# Patient Record
Sex: MALE | HISPANIC OR LATINO | Employment: FULL TIME | URBAN - METROPOLITAN AREA
[De-identification: names, ages, dates, MRNs, and addresses within clinical notes are randomized per-mention and may not be internally consistent; named-entity substitution may affect disease eponyms.]

---

## 2023-03-23 PROBLEM — K21.9 GERD (GASTROESOPHAGEAL REFLUX DISEASE): Status: ACTIVE | Noted: 2023-03-23

## 2023-03-23 PROBLEM — K44.9 HERNIA, HIATAL: Status: ACTIVE | Noted: 2023-03-23

## 2023-03-23 PROBLEM — R12 HEART BURN: Status: ACTIVE | Noted: 2023-03-23

## 2023-03-23 RX ORDER — OMEPRAZOLE/SODIUM BICARBONATE 20MG-1.1G
CAPSULE ORAL
COMMUNITY
End: 2023-04-20 | Stop reason: ALTCHOICE

## 2023-03-23 RX ORDER — CITALOPRAM 20 MG/1
TABLET, FILM COATED ORAL
COMMUNITY
End: 2023-04-20 | Stop reason: ALTCHOICE

## 2023-03-23 RX ORDER — LOSARTAN POTASSIUM 100 MG/1
TABLET ORAL
COMMUNITY
End: 2023-04-20 | Stop reason: ALTCHOICE

## 2023-03-29 ENCOUNTER — APPOINTMENT (OUTPATIENT)
Dept: PRIMARY CARE | Facility: CLINIC | Age: 48
End: 2023-03-29
Payer: COMMERCIAL

## 2023-04-20 ENCOUNTER — OFFICE VISIT (OUTPATIENT)
Dept: PRIMARY CARE | Facility: CLINIC | Age: 48
End: 2023-04-20
Payer: COMMERCIAL

## 2023-04-20 VITALS
HEART RATE: 62 BPM | BODY MASS INDEX: 30.62 KG/M2 | TEMPERATURE: 97.1 F | DIASTOLIC BLOOD PRESSURE: 89 MMHG | OXYGEN SATURATION: 95 % | WEIGHT: 202 LBS | HEIGHT: 68 IN | SYSTOLIC BLOOD PRESSURE: 144 MMHG

## 2023-04-20 DIAGNOSIS — K21.9 GASTROESOPHAGEAL REFLUX DISEASE WITHOUT ESOPHAGITIS: ICD-10-CM

## 2023-04-20 DIAGNOSIS — I10 PRIMARY HYPERTENSION: Primary | ICD-10-CM

## 2023-04-20 DIAGNOSIS — F31.9 BIPOLAR AND RELATED DISORDER (MULTI): ICD-10-CM

## 2023-04-20 DIAGNOSIS — K58.0 IRRITABLE BOWEL SYNDROME WITH DIARRHEA: ICD-10-CM

## 2023-04-20 DIAGNOSIS — F32.A DEPRESSION, UNSPECIFIED DEPRESSION TYPE: ICD-10-CM

## 2023-04-20 PROCEDURE — 3079F DIAST BP 80-89 MM HG: CPT | Performed by: STUDENT IN AN ORGANIZED HEALTH CARE EDUCATION/TRAINING PROGRAM

## 2023-04-20 PROCEDURE — 99214 OFFICE O/P EST MOD 30 MIN: CPT | Performed by: STUDENT IN AN ORGANIZED HEALTH CARE EDUCATION/TRAINING PROGRAM

## 2023-04-20 PROCEDURE — 3077F SYST BP >= 140 MM HG: CPT | Performed by: STUDENT IN AN ORGANIZED HEALTH CARE EDUCATION/TRAINING PROGRAM

## 2023-04-20 RX ORDER — DISULFIRAM 250 MG/1
1 TABLET ORAL DAILY
COMMUNITY
Start: 2022-12-23 | End: 2023-04-20 | Stop reason: ALTCHOICE

## 2023-04-20 RX ORDER — ELUXADOLINE 100 MG/1
TABLET, FILM COATED ORAL
COMMUNITY
Start: 2022-06-22 | End: 2023-10-26 | Stop reason: ALTCHOICE

## 2023-04-20 RX ORDER — VORTIOXETINE 20 MG/1
1 TABLET, FILM COATED ORAL DAILY
COMMUNITY
Start: 2023-02-15 | End: 2023-04-20 | Stop reason: SDUPTHER

## 2023-04-20 RX ORDER — OLMESARTAN MEDOXOMIL 40 MG/1
40 TABLET ORAL DAILY
Qty: 90 TABLET | Refills: 1 | Status: SHIPPED | OUTPATIENT
Start: 2023-04-20 | End: 2023-08-03 | Stop reason: SDUPTHER

## 2023-04-20 RX ORDER — PANTOPRAZOLE SODIUM 40 MG/1
1 TABLET, DELAYED RELEASE ORAL DAILY
COMMUNITY
Start: 2023-02-15 | End: 2023-04-20 | Stop reason: SDUPTHER

## 2023-04-20 RX ORDER — PANTOPRAZOLE SODIUM 40 MG/1
40 TABLET, DELAYED RELEASE ORAL DAILY
Qty: 90 TABLET | Refills: 1 | Status: SHIPPED | OUTPATIENT
Start: 2023-04-20 | End: 2023-08-03 | Stop reason: SDUPTHER

## 2023-04-20 RX ORDER — HYOSCYAMINE SULFATE 0.125 MG
TABLET ORAL
COMMUNITY
Start: 2022-10-28 | End: 2023-04-20 | Stop reason: ALTCHOICE

## 2023-04-20 RX ORDER — QUETIAPINE FUMARATE 100 MG/1
TABLET, FILM COATED ORAL
COMMUNITY
Start: 2023-03-14 | End: 2023-04-20 | Stop reason: SDUPTHER

## 2023-04-20 RX ORDER — OLMESARTAN MEDOXOMIL 40 MG/1
1 TABLET ORAL DAILY
COMMUNITY
Start: 2022-10-06 | End: 2023-04-20 | Stop reason: SDUPTHER

## 2023-04-20 RX ORDER — NEBIVOLOL 20 MG/1
20 TABLET ORAL DAILY
Qty: 90 TABLET | Refills: 1 | Status: SHIPPED | OUTPATIENT
Start: 2023-04-20 | End: 2023-08-03 | Stop reason: SDUPTHER

## 2023-04-20 RX ORDER — QUETIAPINE FUMARATE 200 MG/1
TABLET, FILM COATED ORAL
Qty: 90 TABLET | Refills: 1 | Status: SHIPPED | OUTPATIENT
Start: 2023-04-20 | End: 2023-07-17

## 2023-04-20 RX ORDER — NEBIVOLOL 20 MG/1
1 TABLET ORAL DAILY
COMMUNITY
Start: 2023-04-01 | End: 2023-04-20 | Stop reason: SDUPTHER

## 2023-04-20 ASSESSMENT — ENCOUNTER SYMPTOMS
CHILLS: 0
DIZZINESS: 0
NAUSEA: 0
MUSCULOSKELETAL NEGATIVE: 1
COLOR CHANGE: 0
SHORTNESS OF BREATH: 0
FEVER: 0
HEADACHES: 0
VOMITING: 0
ABDOMINAL PAIN: 0
UNEXPECTED WEIGHT CHANGE: 0
PALPITATIONS: 0
WHEEZING: 0
FATIGUE: 0
CONSTIPATION: 0
CONFUSION: 0
DIARRHEA: 0
COUGH: 0

## 2023-04-20 ASSESSMENT — PATIENT HEALTH QUESTIONNAIRE - PHQ9
SUM OF ALL RESPONSES TO PHQ9 QUESTIONS 1 AND 2: 0
2. FEELING DOWN, DEPRESSED OR HOPELESS: NOT AT ALL
1. LITTLE INTEREST OR PLEASURE IN DOING THINGS: NOT AT ALL

## 2023-04-20 ASSESSMENT — PAIN SCALES - GENERAL: PAINLEVEL: 0-NO PAIN

## 2023-04-20 NOTE — PROGRESS NOTES
"Subjective   Patient ID: Joshua Padilla is a 47 y.o. male who presents for New Patient Visit (Pt is here for NPv, pt used to go to [facility] [place]. Would like to estab care, no other concern. Blood in stool. Pt takes meds which are prescribed by  from [facility]).    HPI   Here to estb care and also for chronic care FU. Reports he has been taking multiple meds and would like refills on them as listed below.     # HTN   - io BP was 144/89  - takes bystolic 20mg, benicar 40 mg.     # Depression # Bipolar disorder   - reports mood been stable; reports he has been on the meds for quite some times and are working well.   - takes seroquel 200 mg ans trintellix 20 mg, req refills     # GERD   - reports reflux been stable   - takes PPI 40 mg daily     # IBD-D  - reports intermittent diarrhea, takes viberzi with help   - he has bene out for last mo and having some flare ups   - reports intermittently gets bld in the stool     Review of Systems   Constitutional:  Negative for chills, fatigue, fever and unexpected weight change.   HENT: Negative.     Respiratory:  Negative for cough, shortness of breath and wheezing.    Cardiovascular:  Negative for chest pain, palpitations and leg swelling.   Gastrointestinal:  Negative for abdominal pain, constipation, diarrhea, nausea and vomiting.   Musculoskeletal: Negative.    Skin:  Negative for color change and rash.   Neurological:  Negative for dizziness and headaches.   Psychiatric/Behavioral:  Negative for behavioral problems and confusion.        Objective   /89 (BP Location: Left arm, Patient Position: Sitting, BP Cuff Size: Adult)   Pulse 62   Temp 36.2 °C (97.1 °F)   Ht 1.715 m (5' 7.5\")   Wt 91.6 kg (202 lb)   SpO2 95%   BMI 31.17 kg/m²     Physical Exam  Vitals and nursing note reviewed.   Constitutional:       Appearance: Normal appearance.   Cardiovascular:      Rate and Rhythm: Normal rate and regular rhythm.      Pulses: Normal " pulses.      Heart sounds: Normal heart sounds.   Pulmonary:      Effort: Pulmonary effort is normal. No respiratory distress.      Breath sounds: Normal breath sounds.   Abdominal:      General: Abdomen is flat. Bowel sounds are normal.      Palpations: Abdomen is soft.   Musculoskeletal:         General: Normal range of motion.   Neurological:      General: No focal deficit present.      Mental Status: He is alert and oriented to person, place, and time.   Psychiatric:         Mood and Affect: Mood normal.         Behavior: Behavior normal.       Assessment/Plan   He is here to [facility] care and also for FU visit. Overall doing okay, no major concerns today and is clinically & vitally stable except elevated BP. Plan as follows     # HTN   - io BP was 144/89, likely from walking to office.   - will cont bystolic 20mg & benicar 40 mg as usual for now   - rec to keep BP logs, rec dash diet & daily exercises     # Depression # Bipolar disorder   - stable mood, cont current meds as below   - cont seroquel 200 mg ans trintellix 20 mg.     # GERD   - well controlled, cont same     # IBD-D  - cont fiber rich diet & hydration   - holding on refills on Viberzi, pending verifications of med records.     Problem List Items Addressed This Visit          Digestive    GERD (gastroesophageal reflux disease)    Relevant Medications    pantoprazole (ProtoNix) 40 mg EC tablet     Other Visit Diagnoses       Primary hypertension    -  Primary    Relevant Medications    nebivolol (Bystolic) 20 mg tablet    olmesartan (BENIcar) 40 mg tablet    Bipolar and related disorder (CMS/HCC)        Relevant Medications    QUEtiapine (SEROquel) 200 mg tablet    Depression, unspecified depression type        Relevant Medications    vortioxetine (Trintellix) 20 mg tablet tablet    QUEtiapine (SEROquel) 200 mg tablet    Irritable bowel syndrome with diarrhea              Rtc 3 mo for FU visit.    Buck Villarreal MD    [facility], Family Medicine

## 2023-06-12 ENCOUNTER — TELEPHONE (OUTPATIENT)
Dept: PRIMARY CARE | Facility: CLINIC | Age: 48
End: 2023-06-12
Payer: COMMERCIAL

## 2023-06-12 NOTE — TELEPHONE ENCOUNTER
Pt is concerned about symptoms below whenever he has a bowel movement. Pt would like you to advise. Pt has had symptoms since the end of April. Pt has a low urine output and pain and pressure with each bowel movement. Pt sees a lot of blood with each bowel movement.   Pharmacy: [facility]

## 2023-06-12 NOTE — TELEPHONE ENCOUNTER
Spoke with pts wife.  She said pt had hernia surgery about a year ago and it has popped back out.  I told pt we would route message but felt he should be looked at by ER.  Pts wife said she didn't know if he would go.

## 2023-06-13 NOTE — TELEPHONE ENCOUNTER
Spoke with pt and offered him an appt a little bit sooner and he said no.  Said he will go to ER if it continues to get worse.

## 2023-07-12 DIAGNOSIS — F32.A DEPRESSION, UNSPECIFIED DEPRESSION TYPE: ICD-10-CM

## 2023-07-12 DIAGNOSIS — F31.9 BIPOLAR AND RELATED DISORDER (MULTI): ICD-10-CM

## 2023-07-13 RX ORDER — LISINOPRIL 20 MG/1
20 TABLET ORAL
COMMUNITY
End: 2023-08-03 | Stop reason: ALTCHOICE

## 2023-07-17 RX ORDER — QUETIAPINE FUMARATE 200 MG/1
TABLET, FILM COATED ORAL
Qty: 60 TABLET | Refills: 0 | Status: SHIPPED | OUTPATIENT
Start: 2023-07-17 | End: 2023-08-03 | Stop reason: ALTCHOICE

## 2023-08-03 ENCOUNTER — OFFICE VISIT (OUTPATIENT)
Dept: PRIMARY CARE | Facility: CLINIC | Age: 48
End: 2023-08-03
Payer: COMMERCIAL

## 2023-08-03 DIAGNOSIS — F31.9 BIPOLAR AND RELATED DISORDER (MULTI): ICD-10-CM

## 2023-08-03 DIAGNOSIS — K21.9 GASTROESOPHAGEAL REFLUX DISEASE WITHOUT ESOPHAGITIS: ICD-10-CM

## 2023-08-03 DIAGNOSIS — R42 DIZZINESS: ICD-10-CM

## 2023-08-03 DIAGNOSIS — E78.49 OTHER HYPERLIPIDEMIA: ICD-10-CM

## 2023-08-03 DIAGNOSIS — R82.998 DARK URINE: ICD-10-CM

## 2023-08-03 DIAGNOSIS — I10 PRIMARY HYPERTENSION: Primary | ICD-10-CM

## 2023-08-03 DIAGNOSIS — Z12.5 SCREENING FOR PROSTATE CANCER: ICD-10-CM

## 2023-08-03 DIAGNOSIS — F32.A DEPRESSION, UNSPECIFIED DEPRESSION TYPE: ICD-10-CM

## 2023-08-03 DIAGNOSIS — R19.5 LOOSE BOWEL MOVEMENT: ICD-10-CM

## 2023-08-03 PROCEDURE — 3074F SYST BP LT 130 MM HG: CPT | Performed by: STUDENT IN AN ORGANIZED HEALTH CARE EDUCATION/TRAINING PROGRAM

## 2023-08-03 PROCEDURE — 99214 OFFICE O/P EST MOD 30 MIN: CPT | Performed by: STUDENT IN AN ORGANIZED HEALTH CARE EDUCATION/TRAINING PROGRAM

## 2023-08-03 PROCEDURE — 3078F DIAST BP <80 MM HG: CPT | Performed by: STUDENT IN AN ORGANIZED HEALTH CARE EDUCATION/TRAINING PROGRAM

## 2023-08-03 RX ORDER — LISINOPRIL 20 MG/1
20 TABLET ORAL
Qty: 90 TABLET | Refills: 0 | Status: CANCELLED | OUTPATIENT
Start: 2023-08-03

## 2023-08-03 RX ORDER — OLMESARTAN MEDOXOMIL 40 MG/1
40 TABLET ORAL DAILY
Qty: 90 TABLET | Refills: 1 | Status: SHIPPED | OUTPATIENT
Start: 2023-08-03 | End: 2024-02-15 | Stop reason: SDUPTHER

## 2023-08-03 RX ORDER — ELUXADOLINE 100 MG/1
TABLET, FILM COATED ORAL
Qty: 90 TABLET | Refills: 0 | Status: CANCELLED | OUTPATIENT
Start: 2023-08-03

## 2023-08-03 RX ORDER — QUETIAPINE FUMARATE 200 MG/1
TABLET, FILM COATED ORAL
Qty: 60 TABLET | Refills: 0 | Status: CANCELLED | OUTPATIENT
Start: 2023-08-03

## 2023-08-03 RX ORDER — PANTOPRAZOLE SODIUM 40 MG/1
40 TABLET, DELAYED RELEASE ORAL DAILY
Qty: 90 TABLET | Refills: 1 | Status: SHIPPED | OUTPATIENT
Start: 2023-08-03 | End: 2024-02-15 | Stop reason: SDUPTHER

## 2023-08-03 RX ORDER — QUETIAPINE FUMARATE 200 MG/1
200 TABLET, FILM COATED ORAL NIGHTLY
Qty: 90 TABLET | Refills: 0 | Status: SHIPPED | OUTPATIENT
Start: 2023-08-03 | End: 2023-11-13

## 2023-08-03 RX ORDER — NEBIVOLOL 20 MG/1
20 TABLET ORAL DAILY
Qty: 90 TABLET | Refills: 1 | Status: SHIPPED | OUTPATIENT
Start: 2023-08-03 | End: 2024-02-15 | Stop reason: SDUPTHER

## 2023-08-03 NOTE — PROGRESS NOTES
Subjective   Patient ID: Joshua Padilla is a 47 y.o. male who presents for Follow-up (Pt is here for FUV, pt has no other concern. Pt Bps readings are low, pt said he felt like passing out twice today.).    HPI   He is here for FU visit. Reports he is doing okay except feeling dizzy for the last few days.      # HTN   - io BP was 93/59; last 115-150/70-80s  - takes benicar 40 mg & bystolic 20 mg    - chart showed some wt lost, about 8 lbs since LOV; reports sl worsening diarrhea from IBS     # Depression # Bipolar disorder   - repots stable mood; takes seroquel 200 mg and trintellix 20 mg daily. Req refills      # GERD   - reports stable reflux sx; takes PPI 40 mg daily      # IBS-D  - reports sl worsening diarrhea as he is not taking Viberzi, still unable rec med records from prior provider.     Review of Systems   Constitutional:  Negative for chills, fatigue, fever and unexpected weight change.   HENT: Negative.     Respiratory:  Negative for cough, shortness of breath and wheezing.    Cardiovascular:  Negative for chest pain, palpitations and leg swelling.   Gastrointestinal:  Negative for abdominal pain, constipation, diarrhea, nausea and vomiting.   Musculoskeletal: Negative.    Skin:  Negative for color change and rash.   Neurological:  Negative for dizziness and headaches.   Psychiatric/Behavioral:  Negative for behavioral problems and confusion.        Objective   BP 93/59 (BP Location: Left arm, Patient Position: Sitting)   Pulse 60   Temp 35.9 °C (96.6 °F)   Wt 88.2 kg (194 lb 6.6 oz)   SpO2 93%   BMI 30.00 kg/m²     Physical Exam  Vitals and nursing note reviewed.   Constitutional:       Appearance: Normal appearance. He is obese.   Cardiovascular:      Rate and Rhythm: Normal rate and regular rhythm.      Pulses: Normal pulses.      Heart sounds: Normal heart sounds.   Pulmonary:      Effort: Pulmonary effort is normal. No respiratory distress.      Breath sounds: Normal breath sounds.    Abdominal:      General: Abdomen is flat. Bowel sounds are normal.      Palpations: Abdomen is soft.   Musculoskeletal:         General: Normal range of motion.   Neurological:      General: No focal deficit present.      Mental Status: He is alert and oriented to person, place, and time.   Psychiatric:         Mood and Affect: Mood normal.         Behavior: Behavior normal.       Assessment/Plan   He is here for FU visit; overall doing okay except intermittent IBS-d flare ups and also low BP likely from dehydration as he is not drinking water as much vs other; otherwise clinically stable. Other plan as follows     # HTN   - io BP was 93/59; last 115-150/70-80s; softer BP, likely from dehydration vs other   - adv to take benicar 40 mg 1/2 tab daily; cont bystolic as usual   - his weight loss likely from ongoing chronic diarrhea   - BW ordered as below along with urine     # Depression # Bipolar disorder   - stable mood, cont current meds as below   - cont seroquel 200 mg and trintellix 20 mg.      # GERD   - well controlled, cont same      # IBS-D  - cont fiber rich diet & hydration   - holding on refills on Viberzi, adv to get the records soon for starting the meds.   Problem List Items Addressed This Visit       GERD (gastroesophageal reflux disease)    Relevant Medications    pantoprazole (ProtoNix) 40 mg EC tablet     Other Visit Diagnoses       Primary hypertension    -  Primary    Relevant Medications    nebivolol (Bystolic) 20 mg tablet    olmesartan (BENIcar) 40 mg tablet    Other Relevant Orders    Comprehensive Metabolic Panel    Bipolar and related disorder (CMS/HCC)        Relevant Medications    QUEtiapine (Seroquel) 200 mg tablet    Depression, unspecified depression type        Relevant Medications    vortioxetine (Trintellix) 20 mg tablet tablet    QUEtiapine (Seroquel) 200 mg tablet    Loose bowel movement        Relevant Orders    Tsh With Reflex To Free T4 If Abnormal    Dizziness         Relevant Orders    CBC and Auto Differential    Comprehensive Metabolic Panel    Tsh With Reflex To Free T4 If Abnormal    Other hyperlipidemia        Relevant Orders    Lipid Panel    Screening for prostate cancer        Relevant Orders    Prostate Spec.Ag,Screen    Dark urine        Relevant Orders    CBC and Auto Differential    Urinalysis with Reflex Microscopic          Rtc 3 mo for HM/FU    Buck Villarreal MD   [facility], Family Medicine

## 2023-08-04 ENCOUNTER — LAB (OUTPATIENT)
Dept: LAB | Facility: LAB | Age: 48
End: 2023-08-04
Payer: COMMERCIAL

## 2023-08-04 VITALS
HEART RATE: 60 BPM | WEIGHT: 194.41 LBS | OXYGEN SATURATION: 93 % | TEMPERATURE: 96.6 F | BODY MASS INDEX: 30 KG/M2 | SYSTOLIC BLOOD PRESSURE: 93 MMHG | DIASTOLIC BLOOD PRESSURE: 59 MMHG

## 2023-08-04 DIAGNOSIS — R42 DIZZINESS: ICD-10-CM

## 2023-08-04 DIAGNOSIS — I10 PRIMARY HYPERTENSION: ICD-10-CM

## 2023-08-04 DIAGNOSIS — Z12.5 SCREENING FOR PROSTATE CANCER: ICD-10-CM

## 2023-08-04 DIAGNOSIS — R82.998 DARK URINE: ICD-10-CM

## 2023-08-04 DIAGNOSIS — E78.49 OTHER HYPERLIPIDEMIA: ICD-10-CM

## 2023-08-04 DIAGNOSIS — R19.5 LOOSE BOWEL MOVEMENT: ICD-10-CM

## 2023-08-04 LAB
ALANINE AMINOTRANSFERASE (SGPT) (U/L) IN SER/PLAS: 8 U/L (ref 10–52)
ALBUMIN (G/DL) IN SER/PLAS: 4.1 G/DL (ref 3.4–5)
ALKALINE PHOSPHATASE (U/L) IN SER/PLAS: 65 U/L (ref 33–120)
ANION GAP IN SER/PLAS: 14 MMOL/L (ref 10–20)
APPEARANCE, URINE: CLEAR
ASPARTATE AMINOTRANSFERASE (SGOT) (U/L) IN SER/PLAS: 14 U/L (ref 9–39)
BASOPHILS (10*3/UL) IN BLOOD BY AUTOMATED COUNT: 0.08 X10E9/L (ref 0–0.1)
BASOPHILS/100 LEUKOCYTES IN BLOOD BY AUTOMATED COUNT: 1.1 % (ref 0–2)
BILIRUBIN TOTAL (MG/DL) IN SER/PLAS: 0.4 MG/DL (ref 0–1.2)
BILIRUBIN, URINE: NEGATIVE
BLOOD, URINE: ABNORMAL
CALCIUM (MG/DL) IN SER/PLAS: 9.1 MG/DL (ref 8.6–10.3)
CARBON DIOXIDE, TOTAL (MMOL/L) IN SER/PLAS: 24 MMOL/L (ref 21–32)
CHLORIDE (MMOL/L) IN SER/PLAS: 106 MMOL/L (ref 98–107)
CHOLESTEROL (MG/DL) IN SER/PLAS: 220 MG/DL (ref 0–199)
CHOLESTEROL IN HDL (MG/DL) IN SER/PLAS: 48.3 MG/DL
CHOLESTEROL/HDL RATIO: 4.6
COLOR, URINE: YELLOW
CREATININE (MG/DL) IN SER/PLAS: 1.34 MG/DL (ref 0.5–1.3)
EOSINOPHILS (10*3/UL) IN BLOOD BY AUTOMATED COUNT: 0.34 X10E9/L (ref 0–0.7)
EOSINOPHILS/100 LEUKOCYTES IN BLOOD BY AUTOMATED COUNT: 4.8 % (ref 0–6)
ERYTHROCYTE DISTRIBUTION WIDTH (RATIO) BY AUTOMATED COUNT: 16.3 % (ref 11.5–14.5)
ERYTHROCYTE MEAN CORPUSCULAR HEMOGLOBIN CONCENTRATION (G/DL) BY AUTOMATED: 31.4 G/DL (ref 32–36)
ERYTHROCYTE MEAN CORPUSCULAR VOLUME (FL) BY AUTOMATED COUNT: 74 FL (ref 80–100)
ERYTHROCYTES (10*6/UL) IN BLOOD BY AUTOMATED COUNT: 4.67 X10E12/L (ref 4.5–5.9)
GFR MALE: 65 ML/MIN/1.73M2
GLUCOSE (MG/DL) IN SER/PLAS: 109 MG/DL (ref 74–99)
GLUCOSE, URINE: NEGATIVE MG/DL
HEMATOCRIT (%) IN BLOOD BY AUTOMATED COUNT: 34.4 % (ref 41–52)
HEMOGLOBIN (G/DL) IN BLOOD: 10.8 G/DL (ref 13.5–17.5)
IMMATURE GRANULOCYTES/100 LEUKOCYTES IN BLOOD BY AUTOMATED COUNT: 0.3 % (ref 0–0.9)
KETONES, URINE: NEGATIVE MG/DL
LDL: 145 MG/DL (ref 0–99)
LEUKOCYTE ESTERASE, URINE: NEGATIVE
LEUKOCYTES (10*3/UL) IN BLOOD BY AUTOMATED COUNT: 7 X10E9/L (ref 4.4–11.3)
LYMPHOCYTES (10*3/UL) IN BLOOD BY AUTOMATED COUNT: 1.91 X10E9/L (ref 1.2–4.8)
LYMPHOCYTES/100 LEUKOCYTES IN BLOOD BY AUTOMATED COUNT: 27.2 % (ref 13–44)
MONOCYTES (10*3/UL) IN BLOOD BY AUTOMATED COUNT: 0.46 X10E9/L (ref 0.1–1)
MONOCYTES/100 LEUKOCYTES IN BLOOD BY AUTOMATED COUNT: 6.5 % (ref 2–10)
MUCUS, URINE: NORMAL /LPF
NEUTROPHILS (10*3/UL) IN BLOOD BY AUTOMATED COUNT: 4.22 X10E9/L (ref 1.2–7.7)
NEUTROPHILS/100 LEUKOCYTES IN BLOOD BY AUTOMATED COUNT: 60.1 % (ref 40–80)
NITRITE, URINE: NEGATIVE
PH, URINE: 6 (ref 5–8)
PLATELETS (10*3/UL) IN BLOOD AUTOMATED COUNT: 451 X10E9/L (ref 150–450)
POTASSIUM (MMOL/L) IN SER/PLAS: 4.8 MMOL/L (ref 3.5–5.3)
PROSTATE SPECIFIC ANTIGEN,SCREEN: 0.55 NG/ML (ref 0–4)
PROTEIN TOTAL: 6.4 G/DL (ref 6.4–8.2)
PROTEIN, URINE: ABNORMAL MG/DL
RBC, URINE: <1 /HPF (ref 0–5)
SODIUM (MMOL/L) IN SER/PLAS: 139 MMOL/L (ref 136–145)
SPECIFIC GRAVITY, URINE: 1.02 (ref 1–1.03)
SQUAMOUS EPITHELIAL CELLS, URINE: <1 /HPF
THYROTROPIN (MIU/L) IN SER/PLAS BY DETECTION LIMIT <= 0.05 MIU/L: 0.52 MIU/L (ref 0.44–3.98)
TRIGLYCERIDE (MG/DL) IN SER/PLAS: 136 MG/DL (ref 0–149)
UREA NITROGEN (MG/DL) IN SER/PLAS: 21 MG/DL (ref 6–23)
UROBILINOGEN, URINE: <2 MG/DL (ref 0–1.9)
VLDL: 27 MG/DL (ref 0–40)
WBC, URINE: <1 /HPF (ref 0–5)

## 2023-08-04 PROCEDURE — 80061 LIPID PANEL: CPT

## 2023-08-04 PROCEDURE — 81001 URINALYSIS AUTO W/SCOPE: CPT

## 2023-08-04 PROCEDURE — 36415 COLL VENOUS BLD VENIPUNCTURE: CPT

## 2023-08-04 PROCEDURE — 80053 COMPREHEN METABOLIC PANEL: CPT

## 2023-08-04 PROCEDURE — 85025 COMPLETE CBC W/AUTO DIFF WBC: CPT

## 2023-08-04 PROCEDURE — 84153 ASSAY OF PSA TOTAL: CPT

## 2023-08-04 PROCEDURE — 84443 ASSAY THYROID STIM HORMONE: CPT

## 2023-08-04 ASSESSMENT — ENCOUNTER SYMPTOMS
WHEEZING: 0
FATIGUE: 0
COUGH: 0
SHORTNESS OF BREATH: 0
MUSCULOSKELETAL NEGATIVE: 1
DIZZINESS: 0
UNEXPECTED WEIGHT CHANGE: 0
CHILLS: 0
FEVER: 0
COLOR CHANGE: 0
PALPITATIONS: 0
CONSTIPATION: 0
HEADACHES: 0
NAUSEA: 0
DIARRHEA: 0
CONFUSION: 0
VOMITING: 0
ABDOMINAL PAIN: 0

## 2023-08-28 ENCOUNTER — TELEPHONE (OUTPATIENT)
Dept: PRIMARY CARE | Facility: CLINIC | Age: 48
End: 2023-08-28
Payer: COMMERCIAL

## 2023-08-28 DIAGNOSIS — K92.1 BLOOD IN THE STOOL: Primary | ICD-10-CM

## 2023-08-28 DIAGNOSIS — R19.7 DIARRHEA, UNSPECIFIED TYPE: ICD-10-CM

## 2023-08-28 DIAGNOSIS — D64.9 ANEMIA, UNSPECIFIED TYPE: ICD-10-CM

## 2023-08-28 DIAGNOSIS — R79.89 ELEVATED SERUM CREATININE: ICD-10-CM

## 2023-10-18 ENCOUNTER — TELEPHONE (OUTPATIENT)
Dept: PRIMARY CARE | Facility: CLINIC | Age: 48
End: 2023-10-18
Payer: COMMERCIAL

## 2023-10-26 ENCOUNTER — OFFICE VISIT (OUTPATIENT)
Dept: GASTROENTEROLOGY | Facility: CLINIC | Age: 48
End: 2023-10-26
Payer: COMMERCIAL

## 2023-10-26 VITALS
HEIGHT: 68 IN | OXYGEN SATURATION: 100 % | HEART RATE: 49 BPM | BODY MASS INDEX: 29.55 KG/M2 | WEIGHT: 195 LBS | DIASTOLIC BLOOD PRESSURE: 74 MMHG | SYSTOLIC BLOOD PRESSURE: 116 MMHG

## 2023-10-26 DIAGNOSIS — R10.30 LOWER ABDOMINAL PAIN: Chronic | ICD-10-CM

## 2023-10-26 DIAGNOSIS — K62.5 RECTAL BLEEDING: Primary | ICD-10-CM

## 2023-10-26 DIAGNOSIS — K52.9 CHRONIC DIARRHEA: Chronic | ICD-10-CM

## 2023-10-26 PROCEDURE — 1036F TOBACCO NON-USER: CPT | Performed by: PHYSICIAN ASSISTANT

## 2023-10-26 PROCEDURE — 99204 OFFICE O/P NEW MOD 45 MIN: CPT | Performed by: PHYSICIAN ASSISTANT

## 2023-10-26 RX ORDER — POLYETHYLENE GLYCOL 3350, SODIUM SULFATE ANHYDROUS, SODIUM BICARBONATE, SODIUM CHLORIDE, POTASSIUM CHLORIDE 236; 22.74; 6.74; 5.86; 2.97 G/4L; G/4L; G/4L; G/4L; G/4L
4000 POWDER, FOR SOLUTION ORAL ONCE
Qty: 4000 ML | Refills: 0 | Status: SHIPPED | OUTPATIENT
Start: 2023-10-26 | End: 2023-10-26

## 2023-10-26 NOTE — ASSESSMENT & PLAN NOTE
"Patient with several years of lower abdominal pain. He states that eating does aggravate his symptoms. He states that the pain is near the belly button and lower. He states that occasionally there is some improvement after a BM, but not aways. Colonoscopy and labs ordered for further evaluation. He states that he \"tried everything\" in the past for pain, but details unknown. He states that viberzi helped the best. If no evidence of IBD or other causes, will recommend restarting.  "

## 2023-10-26 NOTE — PROGRESS NOTES
"Subjective   Patient ID: Joshua Padilla is a 48 y.o. male who was referred by his PCP for New Patient Visit (CT OF ABDOMEN 2/2022/HgB WAS 10.8 ON 8/24/2023/Colonoscopy and EGD done a few years ago at [facility]./), Rectal Bleeding, and Anemia.    Patient's PCP is Dr. Villarreal    HPI  Patient states that he is here to discuss rectal bleeding. He states that he has had rectal bleeding for several years, but feels that it is worsening lately. He states that he is having daily red blood per rectum, sometimes there is clots of red blood. He states that he is experiencing associated chronic diarrhea. He states that on average he has at least 10 episodes of diarrhea daily. He states that his bowel movements are liquid with some pieces of stool and then blood. He states that he is having nocturnal diarrhea. Patient states that he previously saw a Dr. Woodward at [facility] and had EGD and colonoscopy several years ago for his symptoms. He doesn'tt know of the results. He states that a previous PCP had him on viberzi for possible IBS-D, which did help his symptoms some but was discontinued when he switched providers. He states that he has \"tried everything\" for his symptoms. He states that he does have significant diffuse lower abdominal pain. Sometimes the pain is worse before a bowel movements, sometimes not. Sometimes the abdominal pain improves after a BM but not always. His PCP did blood work that showed anemia with hemoglobin of 10.8 with microcytic, hypochromic indices. Iron and ferritin were ordered but not done yet. He states that he used to be a heavy drinker, but no current alcohol use. Reports medical marijuana use, denies tobacco use. He states that his sister passed away from \"GI issues\" but details are unknown. He denies unexplained weight loss, rashes, acute vision changes, dysphagia, N/V, hearburn, melena.    Prior GI evaluation:  Reports EGD and colonoscopy at [facility], results unknown. Will " try to obtain    Review of Systems:  Constitutional: No reported fever, chills, or weight loss.  Skin: No reported icterus, lesions, or rash.  Eye: No reported itching, pain, vision changes.  Ear: No reported discharge, hearing loss, or pain.  Nose: No reported congestion, discharge, or epistaxis.  Mouth/throat: No reported dysphagia, hoarseness, or throat pain.  Resp: No reported cough, dyspnea, or wheezing.  Cardiovascular: No reported chest pain, lower extremity edema, or palpitations.   GI: No reported abdominal pain, diarrhea, constipation, change in bowel habits, nausea, or vomiting.  : No reported dysuria, hematuria, or frequency.  Neuro: No reported confusion, memory loss, headaches, or dizziness.  Psych: No reported anxiety, depression, or insomnia.  Musculoskeletal: No reported arthralgia, joint swelling, or myalgias.  Heme/lymph: No reported easy bleeding or bruising, or swollen lymph nodes.  Endocrine: No reported cold/heat intolerance, polydipsia, or polyuria.      Medications:  Prior to Admission medications    Medication Sig Start Date End Date Taking? Authorizing Provider   Manpreet 236-22.74-6.74 -5.86 gram solution Take 4,000 mL by mouth 1 time for 1 dose. 10/26/23 10/26/23  Lucero Limon PA-C   nebivolol (Bystolic) 20 mg tablet Take 1 tablet (20 mg) by mouth once daily. 8/3/23   Buck Villarreal MD   olmesartan (BENIcar) 40 mg tablet Take 1 tablet (40 mg) by mouth once daily. 8/3/23   Buck Villarreal MD   pantoprazole (ProtoNix) 40 mg EC tablet Take 1 tablet (40 mg) by mouth once daily. 8/3/23   Buck Villarreal MD   QUEtiapine (Seroquel) 200 mg tablet Take 1 tablet (200 mg) by mouth once daily at bedtime. 8/3/23   Buck Villarreal MD   vortioxetine (Trintellix) 20 mg tablet tablet Take 1 tablet (20 mg) by mouth once daily. 8/3/23   Buck Villarreal MD   Viberzi 100 mg tablet TAKE 1 TABLET BY MOUTH WITH FOOD TWICE A DAY 6/22/22 10/26/23  Historical Provider, MD       Allergies:  Codeine  "and Sulfamethoxazole    Past Medical History:  He has a past medical history of Inflammatory bowel disease.    Past Surgical History:  He has a past surgical history that includes Appendectomy; Tonsillectomy; and Hernia repair.    Social History:  He reports that he has never smoked. He has never used smokeless tobacco. He reports that he does not currently use alcohol. He reports current drug use. Drug: Marijuana.    Objective   Physical exam:  Constitutional: Well developed, well nourished 48 y.o. year old in no acute distress.   Skin: Warm and dry. Normal turgor. No rash, ulcer, trauma, jaundice.   Eyes: Pupils symmetric and reactive to light.  Respiratory: Clear to auscultation bilaterally. No wheezes, rhonchi, or rales heard.  Cardiovascular: Regular rate and rhythm. S1 and S2 appreciated and normal. No murmur, rub, or gallop heard.   Edema: No edema noted.  GI: Normal bowel sounds. Soft, non-distended, TTP in RLQ. No rebound or guarding present. No hepatomegaly or splenomegaly appreciated. Abdominal aorta not palpably abnormal.  Musculoskeletal: Limbs and Joints grossly normal. Full range of motion in major joint.   Neuro: Alert and oriented x 3. Cranial nerves 2-12 grossly intact.   Psych: Normal mood and affect.        Relevant Results Recent labs reviewed in the EMR.  Lab Results   Component Value Date    HGB 10.8 (L) 08/04/2023    MCV 74 (L) 08/04/2023     (H) 08/04/2023       No results found for: \"FERRITIN\", \"IRON\"    Lab Results   Component Value Date     08/04/2023    K 4.8 08/04/2023     08/04/2023    BUN 21 08/04/2023    CREATININE 1.34 (H) 08/04/2023       Lab Results   Component Value Date    BILITOT 0.4 08/04/2023     Lab Results   Component Value Date    ALT 8 (L) 08/04/2023    ALT 18 05/18/2021    AST 14 08/04/2023    AST 21 05/18/2021    ALKPHOS 65 08/04/2023    ALKPHOS 74 05/18/2021       No results found for: \"CRP\"    No results found for: \"CALPS\"    Radiology: Reviewed " "imaging reviewed in the EMR.  No results found.    Assessment/Plan   Problem List Items Addressed This Visit             ICD-10-CM       Gastrointestinal and Abdominal    Lower abdominal pain (Chronic) R10.30     Patient with several years of lower abdominal pain. He states that eating does aggravate his symptoms. He states that the pain is near the belly button and lower. He states that occasionally there is some improvement after a BM, but not aways. Colonoscopy and labs ordered for further evaluation. He states that he \"tried everything\" in the past for pain, but details unknown. He states that viberzi helped the best. If no evidence of IBD or other causes, will recommend restarting.         Relevant Medications    Golytely 236-22.74-6.74 -5.86 gram solution    Other Relevant Orders    C. difficile, PCR    Calprotectin, Fecal    C-Reactive Protein    Ova/Para + Giardia/Cryptosporidium Antigen    Pancreatic Elastase, Fecal    Stool Pathogen Panel, PCR    Thyroid Stimulating Hormone    Tissue Transglutaminase IgA    Colonoscopy Diagnostic    Chronic diarrhea (Chronic) K52.9     Patient reports years of diarrhea. He states that on average he has at least 10 episodes of diarrhea daily. He reports significant nocturnal diarrhea. He states that stools are usually liquids with some small pieces and blood mixed in. Will order chronic diarrhea labs and stool studies for further evaluation. He used to drink heavily, but no longer does drink alcohol.          Relevant Medications    Golytely 236-22.74-6.74 -5.86 gram solution    Other Relevant Orders    C. difficile, PCR    Calprotectin, Fecal    C-Reactive Protein    Ova/Para + Giardia/Cryptosporidium Antigen    Pancreatic Elastase, Fecal    Stool Pathogen Panel, PCR    Thyroid Stimulating Hormone    Tissue Transglutaminase IgA    Colonoscopy Diagnostic    Rectal bleeding - Primary K62.5     Patient with years of rectal bleeding. He states that typically has blood most " days. He states that the blood is bright red in the stool along with clots. No melena. He states that he has associated umbilical pain. He has chronic diarrhea as well. Symptoms concerning for possible IBD. Colonoscopy ordered. No upper GI complaints.         Relevant Medications    Golytely 236-22.74-6.74 -5.86 gram solution    Other Relevant Orders    Calprotectin, Fecal    C-Reactive Protein    Colonoscopy Diagnostic         Lucero Limon PA-C

## 2023-10-26 NOTE — ASSESSMENT & PLAN NOTE
Patient with years of rectal bleeding. He states that typically has blood most days. He states that the blood is bright red in the stool along with clots. No melena. He states that he has associated umbilical pain. He has chronic diarrhea as well. Symptoms concerning for possible IBD. Colonoscopy ordered. No upper GI complaints.

## 2023-10-26 NOTE — ASSESSMENT & PLAN NOTE
Patient reports years of diarrhea. He states that on average he has at least 10 episodes of diarrhea daily. He reports significant nocturnal diarrhea. He states that stools are usually liquids with some small pieces and blood mixed in. Will order chronic diarrhea labs and stool studies for further evaluation. He used to drink heavily, but no longer does drink alcohol.

## 2023-10-26 NOTE — H&P (VIEW-ONLY)
"Subjective   Patient ID: Joshua Padilla is a 48 y.o. male who was referred by his PCP for New Patient Visit (CT OF ABDOMEN 2/2022/HgB WAS 10.8 ON 8/24/2023/Colonoscopy and EGD done a few years ago at [facility]./), Rectal Bleeding, and Anemia.    Patient's PCP is Dr. Villarreal    HPI  Patient states that he is here to discuss rectal bleeding. He states that he has had rectal bleeding for several years, but feels that it is worsening lately. He states that he is having daily red blood per rectum, sometimes there is clots of red blood. He states that he is experiencing associated chronic diarrhea. He states that on average he has at least 10 episodes of diarrhea daily. He states that his bowel movements are liquid with some pieces of stool and then blood. He states that he is having nocturnal diarrhea. Patient states that he previously saw a Dr. Woodward at [facility] and had EGD and colonoscopy several years ago for his symptoms. He doesn'tt know of the results. He states that a previous PCP had him on viberzi for possible IBS-D, which did help his symptoms some but was discontinued when he switched providers. He states that he has \"tried everything\" for his symptoms. He states that he does have significant diffuse lower abdominal pain. Sometimes the pain is worse before a bowel movements, sometimes not. Sometimes the abdominal pain improves after a BM but not always. His PCP did blood work that showed anemia with hemoglobin of 10.8 with microcytic, hypochromic indices. Iron and ferritin were ordered but not done yet. He states that he used to be a heavy drinker, but no current alcohol use. Reports medical marijuana use, denies tobacco use. He states that his sister passed away from \"GI issues\" but details are unknown. He denies unexplained weight loss, rashes, acute vision changes, dysphagia, N/V, hearburn, melena.    Prior GI evaluation:  Reports EGD and colonoscopy at [facility], results unknown. Will " try to obtain    Review of Systems:  Constitutional: No reported fever, chills, or weight loss.  Skin: No reported icterus, lesions, or rash.  Eye: No reported itching, pain, vision changes.  Ear: No reported discharge, hearing loss, or pain.  Nose: No reported congestion, discharge, or epistaxis.  Mouth/throat: No reported dysphagia, hoarseness, or throat pain.  Resp: No reported cough, dyspnea, or wheezing.  Cardiovascular: No reported chest pain, lower extremity edema, or palpitations.   GI: No reported abdominal pain, diarrhea, constipation, change in bowel habits, nausea, or vomiting.  : No reported dysuria, hematuria, or frequency.  Neuro: No reported confusion, memory loss, headaches, or dizziness.  Psych: No reported anxiety, depression, or insomnia.  Musculoskeletal: No reported arthralgia, joint swelling, or myalgias.  Heme/lymph: No reported easy bleeding or bruising, or swollen lymph nodes.  Endocrine: No reported cold/heat intolerance, polydipsia, or polyuria.      Medications:  Prior to Admission medications    Medication Sig Start Date End Date Taking? Authorizing Provider   Manpreet 236-22.74-6.74 -5.86 gram solution Take 4,000 mL by mouth 1 time for 1 dose. 10/26/23 10/26/23  Lucero Limon PA-C   nebivolol (Bystolic) 20 mg tablet Take 1 tablet (20 mg) by mouth once daily. 8/3/23   Buck Villarreal MD   olmesartan (BENIcar) 40 mg tablet Take 1 tablet (40 mg) by mouth once daily. 8/3/23   Buck Villarreal MD   pantoprazole (ProtoNix) 40 mg EC tablet Take 1 tablet (40 mg) by mouth once daily. 8/3/23   Buck Villarreal MD   QUEtiapine (Seroquel) 200 mg tablet Take 1 tablet (200 mg) by mouth once daily at bedtime. 8/3/23   Buck Villarreal MD   vortioxetine (Trintellix) 20 mg tablet tablet Take 1 tablet (20 mg) by mouth once daily. 8/3/23   Buck Villarreal MD   Viberzi 100 mg tablet TAKE 1 TABLET BY MOUTH WITH FOOD TWICE A DAY 6/22/22 10/26/23  Historical Provider, MD       Allergies:  Codeine  "and Sulfamethoxazole    Past Medical History:  He has a past medical history of Inflammatory bowel disease.    Past Surgical History:  He has a past surgical history that includes Appendectomy; Tonsillectomy; and Hernia repair.    Social History:  He reports that he has never smoked. He has never used smokeless tobacco. He reports that he does not currently use alcohol. He reports current drug use. Drug: Marijuana.    Objective   Physical exam:  Constitutional: Well developed, well nourished 48 y.o. year old in no acute distress.   Skin: Warm and dry. Normal turgor. No rash, ulcer, trauma, jaundice.   Eyes: Pupils symmetric and reactive to light.  Respiratory: Clear to auscultation bilaterally. No wheezes, rhonchi, or rales heard.  Cardiovascular: Regular rate and rhythm. S1 and S2 appreciated and normal. No murmur, rub, or gallop heard.   Edema: No edema noted.  GI: Normal bowel sounds. Soft, non-distended, TTP in RLQ. No rebound or guarding present. No hepatomegaly or splenomegaly appreciated. Abdominal aorta not palpably abnormal.  Musculoskeletal: Limbs and Joints grossly normal. Full range of motion in major joint.   Neuro: Alert and oriented x 3. Cranial nerves 2-12 grossly intact.   Psych: Normal mood and affect.        Relevant Results Recent labs reviewed in the EMR.  Lab Results   Component Value Date    HGB 10.8 (L) 08/04/2023    MCV 74 (L) 08/04/2023     (H) 08/04/2023       No results found for: \"FERRITIN\", \"IRON\"    Lab Results   Component Value Date     08/04/2023    K 4.8 08/04/2023     08/04/2023    BUN 21 08/04/2023    CREATININE 1.34 (H) 08/04/2023       Lab Results   Component Value Date    BILITOT 0.4 08/04/2023     Lab Results   Component Value Date    ALT 8 (L) 08/04/2023    ALT 18 05/18/2021    AST 14 08/04/2023    AST 21 05/18/2021    ALKPHOS 65 08/04/2023    ALKPHOS 74 05/18/2021       No results found for: \"CRP\"    No results found for: \"CALPS\"    Radiology: Reviewed " "imaging reviewed in the EMR.  No results found.    Assessment/Plan   Problem List Items Addressed This Visit             ICD-10-CM       Gastrointestinal and Abdominal    Lower abdominal pain (Chronic) R10.30     Patient with several years of lower abdominal pain. He states that eating does aggravate his symptoms. He states that the pain is near the belly button and lower. He states that occasionally there is some improvement after a BM, but not aways. Colonoscopy and labs ordered for further evaluation. He states that he \"tried everything\" in the past for pain, but details unknown. He states that viberzi helped the best. If no evidence of IBD or other causes, will recommend restarting.         Relevant Medications    Golytely 236-22.74-6.74 -5.86 gram solution    Other Relevant Orders    C. difficile, PCR    Calprotectin, Fecal    C-Reactive Protein    Ova/Para + Giardia/Cryptosporidium Antigen    Pancreatic Elastase, Fecal    Stool Pathogen Panel, PCR    Thyroid Stimulating Hormone    Tissue Transglutaminase IgA    Colonoscopy Diagnostic    Chronic diarrhea (Chronic) K52.9     Patient reports years of diarrhea. He states that on average he has at least 10 episodes of diarrhea daily. He reports significant nocturnal diarrhea. He states that stools are usually liquids with some small pieces and blood mixed in. Will order chronic diarrhea labs and stool studies for further evaluation. He used to drink heavily, but no longer does drink alcohol.          Relevant Medications    Golytely 236-22.74-6.74 -5.86 gram solution    Other Relevant Orders    C. difficile, PCR    Calprotectin, Fecal    C-Reactive Protein    Ova/Para + Giardia/Cryptosporidium Antigen    Pancreatic Elastase, Fecal    Stool Pathogen Panel, PCR    Thyroid Stimulating Hormone    Tissue Transglutaminase IgA    Colonoscopy Diagnostic    Rectal bleeding - Primary K62.5     Patient with years of rectal bleeding. He states that typically has blood most " days. He states that the blood is bright red in the stool along with clots. No melena. He states that he has associated umbilical pain. He has chronic diarrhea as well. Symptoms concerning for possible IBD. Colonoscopy ordered. No upper GI complaints.         Relevant Medications    Golytely 236-22.74-6.74 -5.86 gram solution    Other Relevant Orders    Calprotectin, Fecal    C-Reactive Protein    Colonoscopy Diagnostic         Lucero Limon PA-C

## 2023-10-26 NOTE — PATIENT INSTRUCTIONS
Thank you for coming in for your appointment.   -Get labs and stool studies done  -Get colonoscopy done  -Try daily fiber supplement, such as metamucil or benefiber  -Will try to obtain records from Dr. Packer.  Call [phone] with questions or concerns.

## 2023-11-01 ENCOUNTER — PREP FOR PROCEDURE (OUTPATIENT)
Dept: GASTROENTEROLOGY | Facility: HOSPITAL | Age: 48
End: 2023-11-01
Payer: COMMERCIAL

## 2023-11-01 ENCOUNTER — ANESTHESIA EVENT (OUTPATIENT)
Dept: GASTROENTEROLOGY | Facility: HOSPITAL | Age: 48
End: 2023-11-01
Payer: COMMERCIAL

## 2023-11-02 ENCOUNTER — TELEPHONE (OUTPATIENT)
Dept: GASTROENTEROLOGY | Facility: CLINIC | Age: 48
End: 2023-11-02

## 2023-11-02 ENCOUNTER — ANESTHESIA (OUTPATIENT)
Dept: GASTROENTEROLOGY | Facility: HOSPITAL | Age: 48
End: 2023-11-02
Payer: COMMERCIAL

## 2023-11-02 ENCOUNTER — HOSPITAL ENCOUNTER (OUTPATIENT)
Dept: GASTROENTEROLOGY | Facility: HOSPITAL | Age: 48
Discharge: HOME | End: 2023-11-02
Payer: COMMERCIAL

## 2023-11-02 VITALS
BODY MASS INDEX: 30.31 KG/M2 | DIASTOLIC BLOOD PRESSURE: 74 MMHG | SYSTOLIC BLOOD PRESSURE: 114 MMHG | WEIGHT: 200 LBS | TEMPERATURE: 97.7 F | OXYGEN SATURATION: 97 % | HEART RATE: 57 BPM | RESPIRATION RATE: 18 BRPM | HEIGHT: 68 IN

## 2023-11-02 DIAGNOSIS — R10.30 LOWER ABDOMINAL PAIN: ICD-10-CM

## 2023-11-02 DIAGNOSIS — K52.9 CHRONIC DIARRHEA: ICD-10-CM

## 2023-11-02 DIAGNOSIS — K64.0 GRADE I HEMORRHOIDS: Primary | ICD-10-CM

## 2023-11-02 DIAGNOSIS — K62.5 RECTAL BLEEDING: ICD-10-CM

## 2023-11-02 PROCEDURE — 3700000002 HC GENERAL ANESTHESIA TIME - EACH INCREMENTAL 1 MINUTE: Performed by: INTERNAL MEDICINE

## 2023-11-02 PROCEDURE — 88341 IMHCHEM/IMCYTCHM EA ADD ANTB: CPT | Performed by: STUDENT IN AN ORGANIZED HEALTH CARE EDUCATION/TRAINING PROGRAM

## 2023-11-02 PROCEDURE — 3700000001 HC GENERAL ANESTHESIA TIME - INITIAL BASE CHARGE: Performed by: INTERNAL MEDICINE

## 2023-11-02 PROCEDURE — 88305 TISSUE EXAM BY PATHOLOGIST: CPT | Mod: TC,SUR,PORLAB | Performed by: INTERNAL MEDICINE

## 2023-11-02 PROCEDURE — 45380 COLONOSCOPY AND BIOPSY: CPT | Performed by: INTERNAL MEDICINE

## 2023-11-02 PROCEDURE — A4649 SURGICAL SUPPLIES: HCPCS | Performed by: INTERNAL MEDICINE

## 2023-11-02 PROCEDURE — 88341 IMHCHEM/IMCYTCHM EA ADD ANTB: CPT | Mod: TC,PORLAB | Performed by: INTERNAL MEDICINE

## 2023-11-02 PROCEDURE — 2500000005 HC RX 250 GENERAL PHARMACY W/O HCPCS: Performed by: NURSE ANESTHETIST, CERTIFIED REGISTERED

## 2023-11-02 PROCEDURE — 2500000004 HC RX 250 GENERAL PHARMACY W/ HCPCS (ALT 636 FOR OP/ED): Performed by: NURSE ANESTHETIST, CERTIFIED REGISTERED

## 2023-11-02 PROCEDURE — 88305 TISSUE EXAM BY PATHOLOGIST: CPT | Performed by: STUDENT IN AN ORGANIZED HEALTH CARE EDUCATION/TRAINING PROGRAM

## 2023-11-02 PROCEDURE — 88365 INSITU HYBRIDIZATION (FISH): CPT | Performed by: STUDENT IN AN ORGANIZED HEALTH CARE EDUCATION/TRAINING PROGRAM

## 2023-11-02 PROCEDURE — 7100000009 HC PHASE TWO TIME - INITIAL BASE CHARGE: Performed by: INTERNAL MEDICINE

## 2023-11-02 PROCEDURE — 88342 IMHCHEM/IMCYTCHM 1ST ANTB: CPT | Performed by: STUDENT IN AN ORGANIZED HEALTH CARE EDUCATION/TRAINING PROGRAM

## 2023-11-02 PROCEDURE — 7100000010 HC PHASE TWO TIME - EACH INCREMENTAL 1 MINUTE: Performed by: INTERNAL MEDICINE

## 2023-11-02 RX ORDER — HYDROCORTISONE 25 MG/G
CREAM TOPICAL 2 TIMES DAILY
Qty: 30 G | Refills: 3 | Status: SHIPPED | OUTPATIENT
Start: 2023-11-02 | End: 2024-02-16 | Stop reason: ALTCHOICE

## 2023-11-02 RX ORDER — PROPOFOL 10 MG/ML
INJECTION, EMULSION INTRAVENOUS AS NEEDED
Status: DISCONTINUED | OUTPATIENT
Start: 2023-11-02 | End: 2023-11-02

## 2023-11-02 RX ORDER — FENTANYL CITRATE 50 UG/ML
INJECTION, SOLUTION INTRAMUSCULAR; INTRAVENOUS AS NEEDED
Status: DISCONTINUED | OUTPATIENT
Start: 2023-11-02 | End: 2023-11-02

## 2023-11-02 RX ORDER — LIDOCAINE HYDROCHLORIDE 20 MG/ML
INJECTION, SOLUTION EPIDURAL; INFILTRATION; INTRACAUDAL; PERINEURAL AS NEEDED
Status: DISCONTINUED | OUTPATIENT
Start: 2023-11-02 | End: 2023-11-02

## 2023-11-02 RX ADMIN — PROPOFOL 100 MG: 10 INJECTION, EMULSION INTRAVENOUS at 11:50

## 2023-11-02 RX ADMIN — PROPOFOL 50 MG: 10 INJECTION, EMULSION INTRAVENOUS at 12:01

## 2023-11-02 RX ADMIN — FENTANYL CITRATE 50 MCG: 50 INJECTION, SOLUTION INTRAMUSCULAR; INTRAVENOUS at 11:50

## 2023-11-02 RX ADMIN — FENTANYL CITRATE 25 MCG: 50 INJECTION, SOLUTION INTRAMUSCULAR; INTRAVENOUS at 12:01

## 2023-11-02 RX ADMIN — PROPOFOL 50 MG: 10 INJECTION, EMULSION INTRAVENOUS at 11:56

## 2023-11-02 RX ADMIN — LIDOCAINE HYDROCHLORIDE 40 MG: 20 INJECTION, SOLUTION EPIDURAL; INFILTRATION; INTRACAUDAL; PERINEURAL at 11:50

## 2023-11-02 RX ADMIN — PROPOFOL 50 MG: 10 INJECTION, EMULSION INTRAVENOUS at 12:04

## 2023-11-02 RX ADMIN — SODIUM CHLORIDE: 9 INJECTION, SOLUTION INTRAVENOUS at 11:46

## 2023-11-02 RX ADMIN — PROPOFOL 50 MG: 10 INJECTION, EMULSION INTRAVENOUS at 11:59

## 2023-11-02 RX ADMIN — PROPOFOL 50 MG: 10 INJECTION, EMULSION INTRAVENOUS at 11:53

## 2023-11-02 RX ADMIN — FENTANYL CITRATE 25 MCG: 50 INJECTION, SOLUTION INTRAMUSCULAR; INTRAVENOUS at 11:55

## 2023-11-02 RX ADMIN — PROPOFOL 50 MG: 10 INJECTION, EMULSION INTRAVENOUS at 12:08

## 2023-11-02 SDOH — HEALTH STABILITY: MENTAL HEALTH: CURRENT SMOKER: 1

## 2023-11-02 ASSESSMENT — COLUMBIA-SUICIDE SEVERITY RATING SCALE - C-SSRS
6. HAVE YOU EVER DONE ANYTHING, STARTED TO DO ANYTHING, OR PREPARED TO DO ANYTHING TO END YOUR LIFE?: NO
2. HAVE YOU ACTUALLY HAD ANY THOUGHTS OF KILLING YOURSELF?: NO
1. IN THE PAST MONTH, HAVE YOU WISHED YOU WERE DEAD OR WISHED YOU COULD GO TO SLEEP AND NOT WAKE UP?: NO

## 2023-11-02 ASSESSMENT — PAIN SCALES - GENERAL
PAINLEVEL_OUTOF10: 0 - NO PAIN
PAINLEVEL_OUTOF10: 0 - NO PAIN
PAIN_LEVEL: 0
PAINLEVEL_OUTOF10: 0 - NO PAIN
PAINLEVEL_OUTOF10: 0 - NO PAIN

## 2023-11-02 ASSESSMENT — PAIN - FUNCTIONAL ASSESSMENT: PAIN_FUNCTIONAL_ASSESSMENT: 0-10

## 2023-11-02 NOTE — TELEPHONE ENCOUNTER
Pt Wife came up to the window today. States that ENDO had told them that there were some stool studies that Lucero had wanted pt to get done but when I checked the chart there was no order for any stool study? They were just looking for clarification on this. Please advise.

## 2023-11-02 NOTE — ANESTHESIA POSTPROCEDURE EVALUATION
Patient: Joshua Padilla    Procedure Summary       Date: 11/02/23 Room / Location: [facility]    Anesthesia Start: 1145 Anesthesia Stop: 1218    Procedure: COLONOSCOPY Diagnosis:       Rectal bleeding      Lower abdominal pain      Chronic diarrhea    Scheduled Providers: Adithya Page MD Responsible Provider: LUCHO Price    Anesthesia Type: MAC ASA Status: 2            Anesthesia Type: MAC    Vitals Value Taken Time   BP 96/59 11/02/23 1218   Temp 36.2 °C (97.2 °F) 11/02/23 1218   Pulse 61 11/02/23 1218   Resp 16 11/02/23 1218   SpO2 98 % 11/02/23 1219       Anesthesia Post Evaluation    Patient location during evaluation: bedside  Patient participation: complete - patient participated  Level of consciousness: awake and alert  Pain score: 0  Pain management: adequate  Airway patency: patent  Cardiovascular status: acceptable  Respiratory status: acceptable  Hydration status: acceptable        There were no known notable events for this encounter.

## 2023-11-02 NOTE — ANESTHESIA PREPROCEDURE EVALUATION
Patient: Joshua Padilla    Procedure Information       Date/Time: 11/02/23 1145    Scheduled providers: Adithya Page MD    Procedure: COLONOSCOPY    Location: [facility]            Relevant Problems   Anesthesia (within normal limits)      Cardiovascular (within normal limits)      Endocrine (within normal limits)      GI   (+) Chronic diarrhea   (+) GERD (gastroesophageal reflux disease)   (+) Hernia, hiatal   (+) Rectal bleeding      /Renal (within normal limits)      Neuro/Psych (within normal limits)      GI/Hepatic (within normal limits)      Hematology (within normal limits)      Musculoskeletal (within normal limits)       Clinical information reviewed:   Tobacco  Allergies  Meds   Med Hx  Surg Hx   Fam Hx  Soc Hx        NPO Detail:  NPO/Void Status  Date of Last Liquid: 11/01/23  Time of Last Liquid: 2000  Date of Last Solid: 10/31/23  Time of Last Solid: 2000  Last Intake Type: Clear fluids         Physical Exam    Airway  Mallampati: II  TM distance: >3 FB  Neck ROM: full     Cardiovascular   Rhythm: regular  Rate: normal     Dental - normal exam     Pulmonary - normal exam     Abdominal - normal exam             Anesthesia Plan    ASA 2     MAC     The patient is a current smoker.  Patient was previously instructed to abstain from smoking on day of procedure.  Patient did not smoke on day of procedure.    intravenous induction   Anesthetic plan and risks discussed with patient.

## 2023-11-02 NOTE — INTERVAL H&P NOTE
H&P reviewed. The patient was examined and there are no changes to the H&P.  The patient presented for evaluation of chronic persistent diarrhea associated with hematochezia of several years duration.  Occasionally there is associated lower abdominal pain with this diarrhea.  His abdomen is nontender on examination.  Labs are pending to evaluate him for inflammatory diarrhea.  We will proceed with colonoscopy today as planned.

## 2023-11-03 NOTE — ADDENDUM NOTE
Encounter addended by: Idalia Llanos RN on: 11/3/2023 12:12 PM   Actions taken: Contacts section saved, Flowsheet accepted

## 2023-11-13 PROBLEM — F10.21 CHRONIC ALCOHOLISM IN REMISSION (MULTI): Status: ACTIVE | Noted: 2021-05-18

## 2023-11-13 PROBLEM — I10 ESSENTIAL HYPERTENSION: Status: ACTIVE | Noted: 2021-05-18

## 2023-11-13 PROBLEM — E66.9 OBESITY: Status: ACTIVE | Noted: 2021-05-18

## 2023-11-13 PROBLEM — F31.9 BIPOLAR DISORDER (MULTI): Status: ACTIVE | Noted: 2021-05-18

## 2023-11-13 PROBLEM — F41.1 ANXIETY STATE: Status: ACTIVE | Noted: 2021-05-18

## 2023-11-16 ENCOUNTER — APPOINTMENT (OUTPATIENT)
Dept: PRIMARY CARE | Facility: CLINIC | Age: 48
End: 2023-11-16
Payer: COMMERCIAL

## 2023-11-22 LAB
LAB AP ASR DISCLAIMER: NORMAL
LABORATORY COMMENT REPORT: NORMAL
PATH REPORT.FINAL DX SPEC: NORMAL
PATH REPORT.GROSS SPEC: NORMAL
PATH REPORT.RELEVANT HX SPEC: NORMAL
PATH REPORT.TOTAL CANCER: NORMAL
RESIDENT REVIEW: NORMAL

## 2023-11-22 PROCEDURE — 88341 IMHCHEM/IMCYTCHM EA ADD ANTB: CPT | Mod: TC,SUR,PORLAB | Performed by: INTERNAL MEDICINE

## 2024-02-05 DIAGNOSIS — F32.A DEPRESSION, UNSPECIFIED DEPRESSION TYPE: ICD-10-CM

## 2024-02-05 DIAGNOSIS — F31.9 BIPOLAR AND RELATED DISORDER (MULTI): ICD-10-CM

## 2024-02-08 RX ORDER — QUETIAPINE FUMARATE 200 MG/1
200 TABLET, FILM COATED ORAL NIGHTLY
Qty: 90 TABLET | Refills: 0 | Status: SHIPPED | OUTPATIENT
Start: 2024-02-08 | End: 2024-02-15 | Stop reason: SDUPTHER

## 2024-02-15 ENCOUNTER — OFFICE VISIT (OUTPATIENT)
Dept: PRIMARY CARE | Facility: CLINIC | Age: 49
End: 2024-02-15
Payer: COMMERCIAL

## 2024-02-15 VITALS
DIASTOLIC BLOOD PRESSURE: 54 MMHG | WEIGHT: 191 LBS | RESPIRATION RATE: 16 BRPM | OXYGEN SATURATION: 99 % | HEART RATE: 68 BPM | HEIGHT: 68 IN | TEMPERATURE: 97.7 F | SYSTOLIC BLOOD PRESSURE: 115 MMHG | BODY MASS INDEX: 28.95 KG/M2

## 2024-02-15 DIAGNOSIS — D64.9 ANEMIA, UNSPECIFIED TYPE: ICD-10-CM

## 2024-02-15 DIAGNOSIS — F32.A DEPRESSION, UNSPECIFIED DEPRESSION TYPE: ICD-10-CM

## 2024-02-15 DIAGNOSIS — I10 PRIMARY HYPERTENSION: Primary | ICD-10-CM

## 2024-02-15 DIAGNOSIS — K21.9 GASTROESOPHAGEAL REFLUX DISEASE WITHOUT ESOPHAGITIS: ICD-10-CM

## 2024-02-15 DIAGNOSIS — F31.9 BIPOLAR AND RELATED DISORDER (MULTI): ICD-10-CM

## 2024-02-15 PROCEDURE — 3078F DIAST BP <80 MM HG: CPT | Performed by: STUDENT IN AN ORGANIZED HEALTH CARE EDUCATION/TRAINING PROGRAM

## 2024-02-15 PROCEDURE — 1036F TOBACCO NON-USER: CPT | Performed by: STUDENT IN AN ORGANIZED HEALTH CARE EDUCATION/TRAINING PROGRAM

## 2024-02-15 PROCEDURE — 99214 OFFICE O/P EST MOD 30 MIN: CPT | Performed by: STUDENT IN AN ORGANIZED HEALTH CARE EDUCATION/TRAINING PROGRAM

## 2024-02-15 PROCEDURE — 3074F SYST BP LT 130 MM HG: CPT | Performed by: STUDENT IN AN ORGANIZED HEALTH CARE EDUCATION/TRAINING PROGRAM

## 2024-02-15 RX ORDER — OLMESARTAN MEDOXOMIL 40 MG/1
40 TABLET ORAL DAILY
Qty: 90 TABLET | Refills: 1 | Status: SHIPPED | OUTPATIENT
Start: 2024-02-15

## 2024-02-15 RX ORDER — NEBIVOLOL 20 MG/1
20 TABLET ORAL DAILY
Qty: 90 TABLET | Refills: 1 | Status: SHIPPED | OUTPATIENT
Start: 2024-02-15

## 2024-02-15 RX ORDER — PANTOPRAZOLE SODIUM 40 MG/1
40 TABLET, DELAYED RELEASE ORAL DAILY
Qty: 90 TABLET | Refills: 1 | Status: SHIPPED | OUTPATIENT
Start: 2024-02-15

## 2024-02-15 RX ORDER — QUETIAPINE FUMARATE 200 MG/1
200 TABLET, FILM COATED ORAL NIGHTLY
Qty: 90 TABLET | Refills: 1 | Status: SHIPPED | OUTPATIENT
Start: 2024-02-15

## 2024-02-15 SDOH — ECONOMIC STABILITY: FOOD INSECURITY: WITHIN THE PAST 12 MONTHS, YOU WORRIED THAT YOUR FOOD WOULD RUN OUT BEFORE YOU GOT MONEY TO BUY MORE.: NEVER TRUE

## 2024-02-15 SDOH — ECONOMIC STABILITY: FOOD INSECURITY: WITHIN THE PAST 12 MONTHS, THE FOOD YOU BOUGHT JUST DIDN'T LAST AND YOU DIDN'T HAVE MONEY TO GET MORE.: NEVER TRUE

## 2024-02-15 ASSESSMENT — LIFESTYLE VARIABLES
AUDIT-C TOTAL SCORE: 0
SKIP TO QUESTIONS 9-10: 1
HOW OFTEN DO YOU HAVE A DRINK CONTAINING ALCOHOL: NEVER
HOW MANY STANDARD DRINKS CONTAINING ALCOHOL DO YOU HAVE ON A TYPICAL DAY: PATIENT DOES NOT DRINK
HOW OFTEN DO YOU HAVE SIX OR MORE DRINKS ON ONE OCCASION: NEVER

## 2024-02-15 ASSESSMENT — PATIENT HEALTH QUESTIONNAIRE - PHQ9
2. FEELING DOWN, DEPRESSED OR HOPELESS: NOT AT ALL
SUM OF ALL RESPONSES TO PHQ9 QUESTIONS 1 & 2: 0
1. LITTLE INTEREST OR PLEASURE IN DOING THINGS: NOT AT ALL

## 2024-02-15 NOTE — PROGRESS NOTES
"Subjective   Patient ID: Joshua Padilla is a 48 y.o. male who presents for Follow-up (No new health concerns).    HPI   He is here for FU visit. Reports he is doing good, no acute complaints today. He went to see GI, had c'scopy (11/23) showing multiple smooth polyps & hemorrhoid, no other acute illness and was rec for repeat c'scopy in 1 yr. Reports he is still having rectal bleeding, usually after BM. 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 Denies dizziness, SOB; reports usual fatigue from long hours of work. Reports he has been taking all the chronic meds as usual; his io /54; mood been stable, taking seroquel and trintellix as usual.     Review of Systems   Constitutional:  Negative for chills, fatigue, fever and unexpected weight change.   HENT: Negative.     Respiratory:  Negative for cough, shortness of breath and wheezing.    Cardiovascular:  Negative for chest pain, palpitations and leg swelling.   Gastrointestinal:  Positive for blood in stool. Negative for abdominal pain, constipation, diarrhea, nausea and vomiting.   Musculoskeletal: Negative.    Skin:  Negative for color change and rash.   Neurological:  Negative for dizziness and headaches.   Psychiatric/Behavioral:  Negative for behavioral problems and confusion.        Objective   /54 (BP Location: Left arm, Patient Position: Sitting, BP Cuff Size: Adult)   Pulse 68   Temp 36.5 °C (97.7 °F)   Resp 16   Ht 1.727 m (5' 8\")   Wt 86.6 kg (191 lb)   SpO2 99%   BMI 29.04 kg/m²     Physical Exam  Vitals and nursing note reviewed.   Constitutional:       Appearance: Normal appearance. He is obese.   Cardiovascular:      Rate and Rhythm: Normal rate and regular rhythm.      Pulses: Normal pulses.      Heart sounds: Normal heart sounds.   Pulmonary:      Effort: Pulmonary effort is normal. No respiratory distress.      Breath sounds: Normal breath sounds.   Abdominal:      General: Abdomen is flat. Bowel sounds are normal. "      Palpations: Abdomen is soft.   Musculoskeletal:         General: Normal range of motion.   Neurological:      General: No focal deficit present.      Mental Status: He is alert and oriented to person, place, and time.   Psychiatric:         Mood and Affect: Mood normal.         Behavior: Behavior normal.       Assessment/Plan   He is here for FU visit. Overall he is doing okay, still having bld in stool; had c'scopy (11/23) showing multiple smooth polyps & hemorrhoid, no other acute illness and was rec for repeat c'scopy in 1 yr. Likely bleeding 2/2 hemorrhoid; advise to get bld & stool work going as ordered by GI and us at [facility] & today. Cont using fiber rich diet, enc hydration. His BP at goal; cont same regimen as before. Rx refilled. His mood been stable; cont the same, rx refilled. Otherwise he is clinically stable.     Problem List Items Addressed This Visit             ICD-10-CM    GERD (gastroesophageal reflux disease) K21.9    Relevant Medications    pantoprazole (ProtoNix) 40 mg EC tablet     Other Visit Diagnoses         Codes    Primary hypertension    -  Primary I10    Relevant Medications    nebivolol (Bystolic) 20 mg tablet    olmesartan (BENIcar) 40 mg tablet    Bipolar and related disorder (CMS/HCC)     F31.9    Relevant Medications    QUEtiapine (SEROquel) 200 mg tablet    Depression, unspecified depression type     F32.A    Relevant Medications    QUEtiapine (SEROquel) 200 mg tablet    vortioxetine (Trintellix) 20 mg tablet tablet    Anemia, unspecified type     D64.9    Relevant Orders    CBC and Auto Differential          Rtc 3-4 mo for FU    Buck Villarreal MD   [facility], Family Medicine

## 2024-02-16 ENCOUNTER — LAB (OUTPATIENT)
Dept: LAB | Facility: LAB | Age: 49
End: 2024-02-16
Payer: COMMERCIAL

## 2024-02-16 DIAGNOSIS — K92.1 BLOOD IN THE STOOL: ICD-10-CM

## 2024-02-16 DIAGNOSIS — R10.30 LOWER ABDOMINAL PAIN: Chronic | ICD-10-CM

## 2024-02-16 DIAGNOSIS — K62.5 RECTAL BLEEDING: ICD-10-CM

## 2024-02-16 DIAGNOSIS — D64.9 ANEMIA, UNSPECIFIED TYPE: ICD-10-CM

## 2024-02-16 DIAGNOSIS — K52.9 CHRONIC DIARRHEA: Chronic | ICD-10-CM

## 2024-02-16 DIAGNOSIS — R19.7 DIARRHEA, UNSPECIFIED TYPE: ICD-10-CM

## 2024-02-16 DIAGNOSIS — R79.89 ELEVATED SERUM CREATININE: ICD-10-CM

## 2024-02-16 LAB
ANION GAP SERPL CALC-SCNC: 9 MMOL/L (ref 10–20)
BASOPHILS # BLD AUTO: 0.08 X10*3/UL (ref 0–0.1)
BASOPHILS NFR BLD AUTO: 1 %
BUN SERPL-MCNC: 12 MG/DL (ref 6–23)
CALCIUM SERPL-MCNC: 8.7 MG/DL (ref 8.6–10.3)
CHLORIDE SERPL-SCNC: 102 MMOL/L (ref 98–107)
CO2 SERPL-SCNC: 29 MMOL/L (ref 21–32)
CREAT SERPL-MCNC: 1.12 MG/DL (ref 0.5–1.3)
CRP SERPL-MCNC: 0.13 MG/DL
EGFRCR SERPLBLD CKD-EPI 2021: 81 ML/MIN/1.73M*2
EOSINOPHIL # BLD AUTO: 0.42 X10*3/UL (ref 0–0.7)
EOSINOPHIL NFR BLD AUTO: 5.1 %
ERYTHROCYTE [DISTWIDTH] IN BLOOD BY AUTOMATED COUNT: 16.9 % (ref 11.5–14.5)
ERYTHROCYTE [SEDIMENTATION RATE] IN BLOOD BY WESTERGREN METHOD: 6 MM/H (ref 0–15)
FERRITIN SERPL-MCNC: 11 NG/ML (ref 20–300)
GLUCOSE SERPL-MCNC: 78 MG/DL (ref 74–99)
HCT VFR BLD AUTO: 32.2 % (ref 41–52)
HGB BLD-MCNC: 10.1 G/DL (ref 13.5–17.5)
IMM GRANULOCYTES # BLD AUTO: 0.03 X10*3/UL (ref 0–0.7)
IMM GRANULOCYTES NFR BLD AUTO: 0.4 % (ref 0–0.9)
IRON SERPL-MCNC: 24 UG/DL (ref 35–150)
LYMPHOCYTES # BLD AUTO: 2.43 X10*3/UL (ref 1.2–4.8)
LYMPHOCYTES NFR BLD AUTO: 29.6 %
MCH RBC QN AUTO: 22.1 PG (ref 26–34)
MCHC RBC AUTO-ENTMCNC: 31.4 G/DL (ref 32–36)
MCV RBC AUTO: 70 FL (ref 80–100)
MONOCYTES # BLD AUTO: 0.57 X10*3/UL (ref 0.1–1)
MONOCYTES NFR BLD AUTO: 7 %
NEUTROPHILS # BLD AUTO: 4.67 X10*3/UL (ref 1.2–7.7)
NEUTROPHILS NFR BLD AUTO: 56.9 %
NRBC BLD-RTO: 0 /100 WBCS (ref 0–0)
PLATELET # BLD AUTO: 457 X10*3/UL (ref 150–450)
POTASSIUM SERPL-SCNC: 5 MMOL/L (ref 3.5–5.3)
RBC # BLD AUTO: 4.58 X10*6/UL (ref 4.5–5.9)
SODIUM SERPL-SCNC: 135 MMOL/L (ref 136–145)
TSH SERPL-ACNC: 0.66 MIU/L (ref 0.44–3.98)
VIT B12 SERPL-MCNC: 183 PG/ML (ref 211–911)
WBC # BLD AUTO: 8.2 X10*3/UL (ref 4.4–11.3)

## 2024-02-16 PROCEDURE — 85025 COMPLETE CBC W/AUTO DIFF WBC: CPT

## 2024-02-16 PROCEDURE — 80048 BASIC METABOLIC PNL TOTAL CA: CPT

## 2024-02-16 PROCEDURE — 82728 ASSAY OF FERRITIN: CPT

## 2024-02-16 PROCEDURE — 83540 ASSAY OF IRON: CPT

## 2024-02-16 PROCEDURE — 86140 C-REACTIVE PROTEIN: CPT

## 2024-02-16 PROCEDURE — 36415 COLL VENOUS BLD VENIPUNCTURE: CPT

## 2024-02-16 PROCEDURE — 84443 ASSAY THYROID STIM HORMONE: CPT

## 2024-02-16 PROCEDURE — 86141 C-REACTIVE PROTEIN HS: CPT

## 2024-02-16 PROCEDURE — 85652 RBC SED RATE AUTOMATED: CPT

## 2024-02-16 PROCEDURE — 83516 IMMUNOASSAY NONANTIBODY: CPT

## 2024-02-16 PROCEDURE — 82607 VITAMIN B-12: CPT

## 2024-02-16 ASSESSMENT — ENCOUNTER SYMPTOMS
DIZZINESS: 0
HEADACHES: 0
UNEXPECTED WEIGHT CHANGE: 0
COUGH: 0
BLOOD IN STOOL: 1
NAUSEA: 0
COLOR CHANGE: 0
CONSTIPATION: 0
PALPITATIONS: 0
VOMITING: 0
SHORTNESS OF BREATH: 0
WHEEZING: 0
FEVER: 0
FATIGUE: 0
DIARRHEA: 0
CONFUSION: 0
MUSCULOSKELETAL NEGATIVE: 1
CHILLS: 0
ABDOMINAL PAIN: 0

## 2024-02-17 ENCOUNTER — LAB (OUTPATIENT)
Dept: LAB | Facility: LAB | Age: 49
End: 2024-02-17
Payer: COMMERCIAL

## 2024-02-17 DIAGNOSIS — R10.30 LOWER ABDOMINAL PAIN: ICD-10-CM

## 2024-02-17 DIAGNOSIS — K92.1 BLOOD IN THE STOOL: ICD-10-CM

## 2024-02-17 DIAGNOSIS — K52.9 CHRONIC DIARRHEA: ICD-10-CM

## 2024-02-17 DIAGNOSIS — R19.7 DIARRHEA, UNSPECIFIED TYPE: ICD-10-CM

## 2024-02-17 DIAGNOSIS — K62.5 RECTAL BLEEDING: ICD-10-CM

## 2024-02-17 LAB
C DIF TOX TCDA+TCDB STL QL NAA+PROBE: NOT DETECTED
CRP SERPL HS-MCNC: 1.1 MG/L
TTG IGA SER IA-ACNC: <1 U/ML

## 2024-02-17 PROCEDURE — 87329 GIARDIA AG IA: CPT

## 2024-02-17 PROCEDURE — 82653 EL-1 FECAL QUANTITATIVE: CPT

## 2024-02-17 PROCEDURE — 87328 CRYPTOSPORIDIUM AG IA: CPT

## 2024-02-17 PROCEDURE — 83993 ASSAY FOR CALPROTECTIN FECAL: CPT

## 2024-02-17 PROCEDURE — 87506 IADNA-DNA/RNA PROBE TQ 6-11: CPT

## 2024-02-17 PROCEDURE — 87493 C DIFF AMPLIFIED PROBE: CPT

## 2024-02-18 LAB

## 2024-02-20 LAB
CALPROTECTIN STL-MCNT: 57 UG/G
CRYPTOSP AG STL QL IA: NEGATIVE
G LAMBLIA AG STL QL IA: NEGATIVE

## 2024-02-21 LAB — O+P STL MICRO: NEGATIVE

## 2024-02-27 LAB — ELASTASE PANC STL-MCNT: 374 UG/G

## 2024-03-20 ENCOUNTER — TELEPHONE (OUTPATIENT)
Dept: PRIMARY CARE | Facility: CLINIC | Age: 49
End: 2024-03-20
Payer: COMMERCIAL

## 2024-03-20 NOTE — TELEPHONE ENCOUNTER
Back on 03/15/24 I had sent this message and was wondering what we should do.  Please advise.      Talked with pt and he is out of town ([place]) for about a month for his job.   Is he able to get the B12 shots down there or should he wait until he is back in town? If not able to get shots for now should he get a B12 supplement?    Buck Villarreal MD  3/14/2024  3:35 PM EDT       Tried calling but unable to get hold of pt; has sl elevated inflammatory markers; has low B12 level & also low iron; rec getting b12 shot weekly x 4 wks then monthly afterwards x 6 mo; also start taking iron supplements, ferrous sulphate 325 mg 2-3 x daily. May take stool softener with iron pill as needed. Other lab result stable,; cont to [facility] as recommended. Follow up as scheduled. -Dr. Villarreal

## 2024-06-20 ENCOUNTER — APPOINTMENT (OUTPATIENT)
Dept: PRIMARY CARE | Facility: CLINIC | Age: 49
End: 2024-06-20
Payer: COMMERCIAL

## 2024-08-10 DIAGNOSIS — K21.9 GASTROESOPHAGEAL REFLUX DISEASE WITHOUT ESOPHAGITIS: ICD-10-CM

## 2024-08-12 RX ORDER — PANTOPRAZOLE SODIUM 40 MG/1
40 TABLET, DELAYED RELEASE ORAL DAILY
Qty: 90 TABLET | Refills: 0 | Status: SHIPPED | OUTPATIENT
Start: 2024-08-12 | End: 2024-08-15 | Stop reason: SDUPTHER

## 2024-08-15 ENCOUNTER — APPOINTMENT (OUTPATIENT)
Dept: PRIMARY CARE | Facility: CLINIC | Age: 49
End: 2024-08-15
Payer: COMMERCIAL

## 2024-08-15 VITALS
DIASTOLIC BLOOD PRESSURE: 80 MMHG | OXYGEN SATURATION: 98 % | HEART RATE: 81 BPM | TEMPERATURE: 97.1 F | WEIGHT: 189 LBS | RESPIRATION RATE: 16 BRPM | BODY MASS INDEX: 28.64 KG/M2 | SYSTOLIC BLOOD PRESSURE: 142 MMHG | HEIGHT: 68 IN

## 2024-08-15 DIAGNOSIS — E78.2 MIXED HYPERLIPIDEMIA: ICD-10-CM

## 2024-08-15 DIAGNOSIS — K21.9 GASTROESOPHAGEAL REFLUX DISEASE WITHOUT ESOPHAGITIS: ICD-10-CM

## 2024-08-15 DIAGNOSIS — D64.9 ANEMIA, UNSPECIFIED TYPE: ICD-10-CM

## 2024-08-15 DIAGNOSIS — F31.9 BIPOLAR AND RELATED DISORDER (MULTI): ICD-10-CM

## 2024-08-15 DIAGNOSIS — I10 PRIMARY HYPERTENSION: Primary | ICD-10-CM

## 2024-08-15 DIAGNOSIS — F32.A DEPRESSION, UNSPECIFIED DEPRESSION TYPE: ICD-10-CM

## 2024-08-15 PROBLEM — F10.21 CHRONIC ALCOHOLISM IN REMISSION (MULTI): Status: RESOLVED | Noted: 2021-05-18 | Resolved: 2024-08-15

## 2024-08-15 PROCEDURE — 99214 OFFICE O/P EST MOD 30 MIN: CPT | Performed by: STUDENT IN AN ORGANIZED HEALTH CARE EDUCATION/TRAINING PROGRAM

## 2024-08-15 PROCEDURE — 3077F SYST BP >= 140 MM HG: CPT | Performed by: STUDENT IN AN ORGANIZED HEALTH CARE EDUCATION/TRAINING PROGRAM

## 2024-08-15 PROCEDURE — 3079F DIAST BP 80-89 MM HG: CPT | Performed by: STUDENT IN AN ORGANIZED HEALTH CARE EDUCATION/TRAINING PROGRAM

## 2024-08-15 PROCEDURE — 3008F BODY MASS INDEX DOCD: CPT | Performed by: STUDENT IN AN ORGANIZED HEALTH CARE EDUCATION/TRAINING PROGRAM

## 2024-08-15 PROCEDURE — 1036F TOBACCO NON-USER: CPT | Performed by: STUDENT IN AN ORGANIZED HEALTH CARE EDUCATION/TRAINING PROGRAM

## 2024-08-15 RX ORDER — PANTOPRAZOLE SODIUM 40 MG/1
40 TABLET, DELAYED RELEASE ORAL DAILY
Qty: 90 TABLET | Refills: 0 | Status: SHIPPED | OUTPATIENT
Start: 2024-08-15

## 2024-08-15 RX ORDER — OLMESARTAN MEDOXOMIL 40 MG/1
40 TABLET ORAL DAILY
Qty: 90 TABLET | Refills: 1 | Status: SHIPPED | OUTPATIENT
Start: 2024-08-15

## 2024-08-15 RX ORDER — QUETIAPINE FUMARATE 200 MG/1
200 TABLET, FILM COATED ORAL NIGHTLY
Qty: 90 TABLET | Refills: 1 | Status: SHIPPED | OUTPATIENT
Start: 2024-08-15

## 2024-08-15 RX ORDER — NEBIVOLOL 20 MG/1
20 TABLET ORAL DAILY
Qty: 90 TABLET | Refills: 1 | Status: SHIPPED | OUTPATIENT
Start: 2024-08-15

## 2024-08-15 SDOH — ECONOMIC STABILITY: FOOD INSECURITY: WITHIN THE PAST 12 MONTHS, THE FOOD YOU BOUGHT JUST DIDN'T LAST AND YOU DIDN'T HAVE MONEY TO GET MORE.: NEVER TRUE

## 2024-08-15 SDOH — ECONOMIC STABILITY: FOOD INSECURITY: WITHIN THE PAST 12 MONTHS, YOU WORRIED THAT YOUR FOOD WOULD RUN OUT BEFORE YOU GOT MONEY TO BUY MORE.: NEVER TRUE

## 2024-08-15 ASSESSMENT — ENCOUNTER SYMPTOMS
FEVER: 0
HEADACHES: 0
CHILLS: 0
SHORTNESS OF BREATH: 0
NERVOUS/ANXIOUS: 0
COUGH: 0
DIZZINESS: 0
NAUSEA: 0
UNEXPECTED WEIGHT CHANGE: 0
WHEEZING: 0
ABDOMINAL PAIN: 0
COLOR CHANGE: 0
DIARRHEA: 0
VOMITING: 0
CONFUSION: 0
PALPITATIONS: 0
FATIGUE: 0
MUSCULOSKELETAL NEGATIVE: 1
CONSTIPATION: 0
SLEEP DISTURBANCE: 0

## 2024-08-15 ASSESSMENT — LIFESTYLE VARIABLES
HOW OFTEN DO YOU HAVE A DRINK CONTAINING ALCOHOL: NEVER
HOW MANY STANDARD DRINKS CONTAINING ALCOHOL DO YOU HAVE ON A TYPICAL DAY: PATIENT DOES NOT DRINK
HOW OFTEN DO YOU HAVE SIX OR MORE DRINKS ON ONE OCCASION: NEVER
AUDIT-C TOTAL SCORE: 0
SKIP TO QUESTIONS 9-10: 1

## 2024-08-15 ASSESSMENT — PATIENT HEALTH QUESTIONNAIRE - PHQ9
6. FEELING BAD ABOUT YOURSELF - OR THAT YOU ARE A FAILURE OR HAVE LET YOURSELF OR YOUR FAMILY DOWN: NOT AT ALL
2. FEELING DOWN, DEPRESSED OR HOPELESS: NEARLY EVERY DAY
4. FEELING TIRED OR HAVING LITTLE ENERGY: NEARLY EVERY DAY
3. TROUBLE FALLING OR STAYING ASLEEP: NEARLY EVERY DAY
9. THOUGHTS THAT YOU WOULD BE BETTER OFF DEAD, OR OF HURTING YOURSELF: NOT AT ALL
1. LITTLE INTEREST OR PLEASURE IN DOING THINGS: NEARLY EVERY DAY
7. TROUBLE CONCENTRATING ON THINGS, SUCH AS READING THE NEWSPAPER OR WATCHING TELEVISION: NOT AT ALL
SUM OF ALL RESPONSES TO PHQ9 QUESTIONS 1 & 2: 6
5. POOR APPETITE OR OVEREATING: NOT AT ALL
8. MOVING OR SPEAKING SO SLOWLY THAT OTHER PEOPLE COULD HAVE NOTICED. OR THE OPPOSITE, BEING SO FIGETY OR RESTLESS THAT YOU HAVE BEEN MOVING AROUND A LOT MORE THAN USUAL: NEARLY EVERY DAY
SUM OF ALL RESPONSES TO PHQ QUESTIONS 1-9: 15

## 2024-08-15 ASSESSMENT — PAIN SCALES - GENERAL: PAINLEVEL: 0-NO PAIN

## 2024-08-15 NOTE — PROGRESS NOTES
"Subjective   Patient ID: Joshua Padilla is a 48 y.o. male who presents for Follow-up (6 month follow up and med refills).    HPI   He is here for FU visit. Reports he is doing good, no acute complaints today except stressed out little bit. Reports he hasn't had rectal bleed in last 2 mo after quit drinking alcohol and energy drinks; now drinking lot of coffee.  Reports he has been taking all the chronic meds as usual; his io /83, mood been stable, taking seroquel and trintellix as usual.     Review of Systems   Constitutional:  Negative for chills, fatigue, fever and unexpected weight change.   HENT: Negative.     Respiratory:  Negative for cough, shortness of breath and wheezing.    Cardiovascular:  Negative for chest pain, palpitations and leg swelling.   Gastrointestinal:  Negative for abdominal pain, constipation, diarrhea, nausea and vomiting.   Musculoskeletal: Negative.    Skin:  Negative for color change and rash.   Neurological:  Negative for dizziness and headaches.   Psychiatric/Behavioral:  Negative for behavioral problems, confusion, self-injury, sleep disturbance and suicidal ideas. The patient is not nervous/anxious.        Objective   /80 (BP Location: Right arm, Patient Position: Sitting, BP Cuff Size: Adult)   Pulse 81   Temp 36.2 °C (97.1 °F) (Temporal)   Resp 16   Ht 1.727 m (5' 8\")   Wt 85.7 kg (189 lb)   SpO2 98%   BMI 28.74 kg/m²     Physical Exam  Vitals and nursing note reviewed.   Constitutional:       Appearance: Normal appearance.   Cardiovascular:      Rate and Rhythm: Normal rate and regular rhythm.      Pulses: Normal pulses.      Heart sounds: Normal heart sounds.   Pulmonary:      Effort: Pulmonary effort is normal.      Breath sounds: Normal breath sounds.   Abdominal:      General: Abdomen is flat. Bowel sounds are normal.      Palpations: Abdomen is soft.   Musculoskeletal:         General: Normal range of motion.   Neurological:      General: No focal deficit " present.      Mental Status: He is alert.   Psychiatric:         Mood and Affect: Mood normal.         Behavior: Behavior normal.       Assessment/Plan   He is here for FU visit. He is doing much better, no more GI bleeding after quitting drinking alcohol and energy drinks. Also enc to cut down on coffee intake as drinking too much now. Cont taking B12 & Vit daily. Pending CBC for starting iron pill.   Other chronic problems been stable, cont all meds as usual. He is otherwise clinically & vitally stable except BP. His BP likely from stressed today d/t issues with bank as he just got off phone with them. Keep BP logs, follow DASH diet. Other plan as follows   Problem List Items Addressed This Visit             ICD-10-CM    GERD (gastroesophageal reflux disease) K21.9    Relevant Medications    pantoprazole (ProtoNix) 40 mg EC tablet     Other Visit Diagnoses         Codes    Primary hypertension    -  Primary I10    Relevant Medications    nebivolol (Bystolic) 20 mg tablet    olmesartan (BENIcar) 40 mg tablet    Other Relevant Orders    Comprehensive Metabolic Panel    Bipolar and related disorder (Multi)     F31.9    Relevant Medications    QUEtiapine (SEROquel) 200 mg tablet    Depression, unspecified depression type     F32.A    Relevant Medications    QUEtiapine (SEROquel) 200 mg tablet    vortioxetine (Trintellix) 20 mg tablet tablet    Anemia, unspecified type     D64.9    Relevant Orders    CBC and Auto Differential    Mixed hyperlipidemia     E78.2    Relevant Orders    Lipid Panel          Rtc 3-4 mo for HM/FU    Buck Villarreal MD    [facility], [facility]

## 2024-09-23 ENCOUNTER — LAB (OUTPATIENT)
Dept: LAB | Facility: LAB | Age: 49
End: 2024-09-23
Payer: COMMERCIAL

## 2024-09-23 DIAGNOSIS — I10 PRIMARY HYPERTENSION: ICD-10-CM

## 2024-09-23 DIAGNOSIS — D64.9 ANEMIA, UNSPECIFIED TYPE: ICD-10-CM

## 2024-09-23 DIAGNOSIS — E78.2 MIXED HYPERLIPIDEMIA: ICD-10-CM

## 2024-09-23 LAB
ALBUMIN SERPL BCP-MCNC: 3.9 G/DL (ref 3.4–5)
ALP SERPL-CCNC: 57 U/L (ref 33–120)
ALT SERPL W P-5'-P-CCNC: 6 U/L (ref 10–52)
ANION GAP SERPL CALC-SCNC: 10 MMOL/L (ref 10–20)
AST SERPL W P-5'-P-CCNC: 14 U/L (ref 9–39)
BASOPHILS # BLD AUTO: 0.08 X10*3/UL (ref 0–0.1)
BASOPHILS NFR BLD AUTO: 1.3 %
BILIRUB SERPL-MCNC: 0.5 MG/DL (ref 0–1.2)
BUN SERPL-MCNC: 10 MG/DL (ref 6–23)
CALCIUM SERPL-MCNC: 8.2 MG/DL (ref 8.6–10.3)
CHLORIDE SERPL-SCNC: 107 MMOL/L (ref 98–107)
CHOLEST SERPL-MCNC: 162 MG/DL (ref 0–199)
CHOLESTEROL/HDL RATIO: 2.7
CO2 SERPL-SCNC: 25 MMOL/L (ref 21–32)
CREAT SERPL-MCNC: 0.96 MG/DL (ref 0.5–1.3)
EGFRCR SERPLBLD CKD-EPI 2021: >90 ML/MIN/1.73M*2
EOSINOPHIL # BLD AUTO: 0.26 X10*3/UL (ref 0–0.7)
EOSINOPHIL NFR BLD AUTO: 4.1 %
ERYTHROCYTE [DISTWIDTH] IN BLOOD BY AUTOMATED COUNT: 19.4 % (ref 11.5–14.5)
GLUCOSE SERPL-MCNC: 112 MG/DL (ref 74–99)
HCT VFR BLD AUTO: 27.9 % (ref 41–52)
HDLC SERPL-MCNC: 60.3 MG/DL
HGB BLD-MCNC: 8 G/DL (ref 13.5–17.5)
IMM GRANULOCYTES # BLD AUTO: 0.04 X10*3/UL (ref 0–0.7)
IMM GRANULOCYTES NFR BLD AUTO: 0.6 % (ref 0–0.9)
LDLC SERPL CALC-MCNC: 91 MG/DL
LYMPHOCYTES # BLD AUTO: 1.51 X10*3/UL (ref 1.2–4.8)
LYMPHOCYTES NFR BLD AUTO: 23.8 %
MCH RBC QN AUTO: 17.9 PG (ref 26–34)
MCHC RBC AUTO-ENTMCNC: 28.7 G/DL (ref 32–36)
MCV RBC AUTO: 62 FL (ref 80–100)
MONOCYTES # BLD AUTO: 0.53 X10*3/UL (ref 0.1–1)
MONOCYTES NFR BLD AUTO: 8.3 %
NEUTROPHILS # BLD AUTO: 3.93 X10*3/UL (ref 1.2–7.7)
NEUTROPHILS NFR BLD AUTO: 61.9 %
NON HDL CHOLESTEROL: 102 MG/DL (ref 0–149)
NRBC BLD-RTO: 0 /100 WBCS (ref 0–0)
PLATELET # BLD AUTO: 502 X10*3/UL (ref 150–450)
POTASSIUM SERPL-SCNC: 4 MMOL/L (ref 3.5–5.3)
PROT SERPL-MCNC: 6.1 G/DL (ref 6.4–8.2)
RBC # BLD AUTO: 4.48 X10*6/UL (ref 4.5–5.9)
SODIUM SERPL-SCNC: 138 MMOL/L (ref 136–145)
TRIGL SERPL-MCNC: 56 MG/DL (ref 0–149)
VLDL: 11 MG/DL (ref 0–40)
WBC # BLD AUTO: 6.4 X10*3/UL (ref 4.4–11.3)

## 2024-09-23 PROCEDURE — 80053 COMPREHEN METABOLIC PANEL: CPT

## 2024-09-23 PROCEDURE — 85025 COMPLETE CBC W/AUTO DIFF WBC: CPT

## 2024-09-23 PROCEDURE — 80061 LIPID PANEL: CPT

## 2024-09-23 PROCEDURE — 36415 COLL VENOUS BLD VENIPUNCTURE: CPT

## 2024-10-01 ENCOUNTER — TELEPHONE (OUTPATIENT)
Dept: PRIMARY CARE | Facility: CLINIC | Age: 49
End: 2024-10-01
Payer: COMMERCIAL

## 2024-10-02 DIAGNOSIS — D64.9 ANEMIA, UNSPECIFIED TYPE: Primary | ICD-10-CM

## 2024-12-05 ENCOUNTER — APPOINTMENT (OUTPATIENT)
Dept: PRIMARY CARE | Facility: CLINIC | Age: 49
End: 2024-12-05
Payer: COMMERCIAL

## 2024-12-05 ENCOUNTER — OFFICE VISIT (OUTPATIENT)
Dept: PRIMARY CARE | Facility: CLINIC | Age: 49
End: 2024-12-05
Payer: COMMERCIAL

## 2024-12-05 VITALS
HEIGHT: 68 IN | WEIGHT: 185 LBS | OXYGEN SATURATION: 99 % | DIASTOLIC BLOOD PRESSURE: 82 MMHG | TEMPERATURE: 96.9 F | BODY MASS INDEX: 28.04 KG/M2 | SYSTOLIC BLOOD PRESSURE: 131 MMHG | HEART RATE: 67 BPM | RESPIRATION RATE: 16 BRPM

## 2024-12-05 DIAGNOSIS — K52.9 CHRONIC DIARRHEA: Chronic | ICD-10-CM

## 2024-12-05 DIAGNOSIS — K44.9 HERNIA, HIATAL: ICD-10-CM

## 2024-12-05 DIAGNOSIS — F31.9 BIPOLAR AND RELATED DISORDER (MULTI): ICD-10-CM

## 2024-12-05 DIAGNOSIS — Z00.00 ROUTINE GENERAL MEDICAL EXAMINATION AT A HEALTH CARE FACILITY: Primary | ICD-10-CM

## 2024-12-05 DIAGNOSIS — K62.5 RECTAL BLEEDING: ICD-10-CM

## 2024-12-05 DIAGNOSIS — E78.2 MIXED HYPERLIPIDEMIA: ICD-10-CM

## 2024-12-05 DIAGNOSIS — K21.9 GASTROESOPHAGEAL REFLUX DISEASE WITHOUT ESOPHAGITIS: ICD-10-CM

## 2024-12-05 DIAGNOSIS — I10 PRIMARY HYPERTENSION: ICD-10-CM

## 2024-12-05 DIAGNOSIS — F32.A DEPRESSION, UNSPECIFIED DEPRESSION TYPE: ICD-10-CM

## 2024-12-05 DIAGNOSIS — Z12.5 PROSTATE CANCER SCREENING: ICD-10-CM

## 2024-12-05 DIAGNOSIS — D64.9 ANEMIA, UNSPECIFIED TYPE: ICD-10-CM

## 2024-12-05 PROBLEM — K42.9 UMBILICAL HERNIA WITHOUT OBSTRUCTION AND WITHOUT GANGRENE: Status: ACTIVE | Noted: 2024-12-05

## 2024-12-05 PROBLEM — K40.90 NON-RECURRENT UNILATERAL INGUINAL HERNIA WITHOUT OBSTRUCTION OR GANGRENE: Status: ACTIVE | Noted: 2024-12-05

## 2024-12-05 PROBLEM — F33.1 MODERATE RECURRENT MAJOR DEPRESSION: Status: ACTIVE | Noted: 2024-12-05

## 2024-12-05 PROBLEM — F51.01 PRIMARY INSOMNIA: Status: ACTIVE | Noted: 2024-12-05

## 2024-12-05 PROBLEM — F19.11 HISTORY OF SUBSTANCE ABUSE (MULTI): Status: ACTIVE | Noted: 2024-12-05

## 2024-12-05 PROBLEM — F41.1 GENERALIZED ANXIETY DISORDER: Status: ACTIVE | Noted: 2024-12-05

## 2024-12-05 PROBLEM — E55.9 VITAMIN D DEFICIENCY: Status: ACTIVE | Noted: 2024-12-05

## 2024-12-05 PROBLEM — F10.10 ALCOHOL ABUSE: Status: ACTIVE | Noted: 2024-12-05

## 2024-12-05 PROBLEM — K21.00 GASTROESOPHAGEAL REFLUX DISEASE WITH ESOPHAGITIS WITHOUT HEMORRHAGE: Status: ACTIVE | Noted: 2024-12-05

## 2024-12-05 PROBLEM — G47.30 SLEEP APNEA: Status: ACTIVE | Noted: 2024-12-05

## 2024-12-05 PROCEDURE — 99214 OFFICE O/P EST MOD 30 MIN: CPT | Performed by: STUDENT IN AN ORGANIZED HEALTH CARE EDUCATION/TRAINING PROGRAM

## 2024-12-05 PROCEDURE — 3075F SYST BP GE 130 - 139MM HG: CPT | Performed by: STUDENT IN AN ORGANIZED HEALTH CARE EDUCATION/TRAINING PROGRAM

## 2024-12-05 PROCEDURE — 3079F DIAST BP 80-89 MM HG: CPT | Performed by: STUDENT IN AN ORGANIZED HEALTH CARE EDUCATION/TRAINING PROGRAM

## 2024-12-05 PROCEDURE — 1036F TOBACCO NON-USER: CPT | Performed by: STUDENT IN AN ORGANIZED HEALTH CARE EDUCATION/TRAINING PROGRAM

## 2024-12-05 PROCEDURE — 3008F BODY MASS INDEX DOCD: CPT | Performed by: STUDENT IN AN ORGANIZED HEALTH CARE EDUCATION/TRAINING PROGRAM

## 2024-12-05 PROCEDURE — 99396 PREV VISIT EST AGE 40-64: CPT | Performed by: STUDENT IN AN ORGANIZED HEALTH CARE EDUCATION/TRAINING PROGRAM

## 2024-12-05 RX ORDER — PANTOPRAZOLE SODIUM 40 MG/1
40 TABLET, DELAYED RELEASE ORAL DAILY
Qty: 90 TABLET | Refills: 0 | Status: SHIPPED | OUTPATIENT
Start: 2024-12-05

## 2024-12-05 SDOH — ECONOMIC STABILITY: FOOD INSECURITY: WITHIN THE PAST 12 MONTHS, THE FOOD YOU BOUGHT JUST DIDN'T LAST AND YOU DIDN'T HAVE MONEY TO GET MORE.: NEVER TRUE

## 2024-12-05 SDOH — ECONOMIC STABILITY: FOOD INSECURITY: WITHIN THE PAST 12 MONTHS, YOU WORRIED THAT YOUR FOOD WOULD RUN OUT BEFORE YOU GOT MONEY TO BUY MORE.: NEVER TRUE

## 2024-12-05 ASSESSMENT — LIFESTYLE VARIABLES
HOW OFTEN DO YOU HAVE SIX OR MORE DRINKS ON ONE OCCASION: NEVER
SKIP TO QUESTIONS 9-10: 1
HOW OFTEN DO YOU HAVE A DRINK CONTAINING ALCOHOL: NEVER
AUDIT-C TOTAL SCORE: 0
HOW MANY STANDARD DRINKS CONTAINING ALCOHOL DO YOU HAVE ON A TYPICAL DAY: PATIENT DOES NOT DRINK

## 2024-12-05 ASSESSMENT — ENCOUNTER SYMPTOMS
CONSTIPATION: 0
MUSCULOSKELETAL NEGATIVE: 1
DIZZINESS: 0
VOMITING: 0
PALPITATIONS: 0
ABDOMINAL PAIN: 0
SHORTNESS OF BREATH: 0
UNEXPECTED WEIGHT CHANGE: 0
NAUSEA: 0
DIARRHEA: 0
FEVER: 0
COLOR CHANGE: 0
COUGH: 0
CONFUSION: 0
FATIGUE: 0
HEADACHES: 0
CHILLS: 0
WHEEZING: 0

## 2024-12-05 ASSESSMENT — PATIENT HEALTH QUESTIONNAIRE - PHQ9
1. LITTLE INTEREST OR PLEASURE IN DOING THINGS: NOT AT ALL
SUM OF ALL RESPONSES TO PHQ9 QUESTIONS 1 & 2: 0
2. FEELING DOWN, DEPRESSED OR HOPELESS: NOT AT ALL

## 2024-12-05 NOTE — PROGRESS NOTES
Subjective   Patient ID: Joshua Padilla is a 49 y.o. male who presents for Annual Exam (Pt has no new concerns today,  pt declines flu vaccine).  He is here for annual & FU visit.  Wife in the room.  Reports he is doing good, no illness today. Reports he has been taking all the chronic meds as usual; his io /82, taking bystolic 20 mg daily & olmesartan 40 mg daily. Reports his mood been stable, taking seroquel and trintellix as usual. His recent bld work (09/2024) lipid improved with LDL at 91 (prev was 145, 8/23), Hgb at 8.0 & CMP remains stable. He was given referral to see Hem/Onc, has appt with them in 02/24/25. Reports he is still having rectal bleeding; feels gassy and gets rectal bleed afterwards.     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  Sexually active: Yes     Dentist: yes   Eye doctor: yes   Hearing issues: No    Family history of colon cancer: no  Last colonoscopy & result: 11/2023, rec repeat in 1 yr   Family history of prostate cancer: none; no urinary issues   History of abnormal lipids: no    Review of Systems   Constitutional:  Negative for chills, fatigue, fever and unexpected weight change.   HENT: Negative.     Respiratory:  Negative for cough, shortness of breath and wheezing.    Cardiovascular:  Negative for chest pain, palpitations and leg swelling.   Gastrointestinal:  Negative for abdominal pain, constipation, diarrhea, nausea and vomiting.   Musculoskeletal: Negative.    Skin:  Negative for color change and rash.   Neurological:  Negative for dizziness and headaches.   Psychiatric/Behavioral:  Negative for behavioral problems and confusion.         Objective    /82 (BP Location: Right arm, Patient Position: Sitting, BP Cuff Size:  "Large adult)   Pulse 67   Temp 36.1 °C (96.9 °F) (Temporal)   Resp 16   Ht 1.727 m (5' 8\")   Wt 83.9 kg (185 lb)   SpO2 99%   BMI 28.13 kg/m²  Body mass index is 28.13 kg/m².    Physical Exam  Vitals and nursing note reviewed.   Constitutional:       Appearance: Normal appearance.      Comments: Slightly pale appearing male.  Wife in the room   HENT:      Right Ear: Tympanic membrane normal.      Left Ear: Tympanic membrane normal.   Eyes:      Extraocular Movements: Extraocular movements intact.      Pupils: Pupils are equal, round, and reactive to light.   Cardiovascular:      Rate and Rhythm: Normal rate and regular rhythm.      Pulses: Normal pulses.      Heart sounds: Normal heart sounds.   Pulmonary:      Effort: Pulmonary effort is normal. No respiratory distress.      Breath sounds: Normal breath sounds.   Abdominal:      General: Abdomen is flat. Bowel sounds are normal.      Palpations: Abdomen is soft.   Musculoskeletal:         General: Normal range of motion.   Skin:     Coloration: Skin is pale.   Neurological:      General: No focal deficit present.      Mental Status: He is alert and oriented to person, place, and time.      Cranial Nerves: No cranial nerve deficit.      Sensory: No sensory deficit.      Motor: No weakness.   Psychiatric:         Mood and Affect: Mood normal.         Behavior: Behavior normal.        Assessment and Plan   He is here for annual physical and to follow-up.  His last Hgb remains low at 8.0, 09/2024 and appeared pale, likely still having severe anemia.  Obtain CBC and CMP as below.  Pending blood work result for further management.  Keep appointment with heme-onc as scheduled.  Seek ER care if symptom worsens.  BP remains controlled, continue same.  Follow DASH diet.  Mood been is stable on Seroquel and Trintellix, continue same.  Other chronic problems are stable; continue all medications as usual.  Otherwise he is doing okay and is clinically & vitally stable. " Plan as follows      #HM   Screening tests:  - Colonoscopy (age 45-75): Continue follow-up with GI, referral placed  - PSA (age >50): Ordered  - Lipid profile: UTD  - Low dose CT chest (age 50-80): N/A    Primary prevention:  - Vaccines: Declined  - Statin (age 40-65 or high risk): Not taking    Counseling:   - Smoking: Advise to cut/quit on smoking. Offers different options to help w/ cessations.   - Diet, Weight gain: Advise heart healthy diet (low carbs, low fat; add more fruits/veges and whole grain food) and regular exercise 30mins daily x 5 days per week.  Also rec 10mins of aerobic exercise/jogging daily x 5 days/wk  - Rec Ca (600-1200mg) and Vit D (800-1000U) daily     Others:  - Depression screening: Neg, happy appearing male  - Bld work per EMR, will call for any abn result, pt was made aware   - cont taking all other meds as rx'd       Assessment/Plan   Problem List Items Addressed This Visit             ICD-10-CM    GERD (gastroesophageal reflux disease) K21.9    Relevant Medications    pantoprazole (ProtoNix) 40 mg EC tablet    Hernia, hiatal K44.9    Relevant Orders    Referral to Gastroenterology    Rectal bleeding K62.5    Relevant Orders    Referral to Gastroenterology    Chronic diarrhea (Chronic) K52.9    Relevant Orders    Referral to Gastroenterology     Other Visit Diagnoses         Codes    Routine general medical examination at a health care facility    -  Primary Z00.00    Bipolar and related disorder (Multi)     F31.9    Mixed hyperlipidemia     E78.2    Primary hypertension     I10    Relevant Orders    Comprehensive Metabolic Panel    Depression, unspecified depression type     F32.A    Anemia, unspecified type     D64.9    Relevant Orders    CBC and Auto Differential    Comprehensive Metabolic Panel    Prostate cancer screening     Z12.5    Relevant Orders    Prostate Spec.Ag,Screen          Rtc 4 mo for FU   Buck Villarreal MD   [facility], [facility]

## 2024-12-09 ENCOUNTER — LAB (OUTPATIENT)
Dept: LAB | Facility: LAB | Age: 49
End: 2024-12-09
Payer: COMMERCIAL

## 2024-12-09 DIAGNOSIS — D64.9 ANEMIA, UNSPECIFIED TYPE: ICD-10-CM

## 2024-12-09 DIAGNOSIS — I10 PRIMARY HYPERTENSION: ICD-10-CM

## 2024-12-09 DIAGNOSIS — Z12.5 PROSTATE CANCER SCREENING: ICD-10-CM

## 2024-12-09 LAB
ALBUMIN SERPL BCP-MCNC: 3.8 G/DL (ref 3.4–5)
ALP SERPL-CCNC: 66 U/L (ref 33–120)
ALT SERPL W P-5'-P-CCNC: 7 U/L (ref 10–52)
ANION GAP SERPL CALC-SCNC: 9 MMOL/L (ref 10–20)
AST SERPL W P-5'-P-CCNC: 13 U/L (ref 9–39)
BASOPHILS # BLD AUTO: 0.09 X10*3/UL (ref 0–0.1)
BASOPHILS NFR BLD AUTO: 1.2 %
BILIRUB SERPL-MCNC: 0.2 MG/DL (ref 0–1.2)
BUN SERPL-MCNC: 9 MG/DL (ref 6–23)
CALCIUM SERPL-MCNC: 8.8 MG/DL (ref 8.6–10.3)
CHLORIDE SERPL-SCNC: 108 MMOL/L (ref 98–107)
CO2 SERPL-SCNC: 28 MMOL/L (ref 21–32)
CREAT SERPL-MCNC: 0.91 MG/DL (ref 0.5–1.3)
DACRYOCYTES BLD QL SMEAR: NORMAL
EGFRCR SERPLBLD CKD-EPI 2021: >90 ML/MIN/1.73M*2
EOSINOPHIL # BLD AUTO: 0.39 X10*3/UL (ref 0–0.7)
EOSINOPHIL NFR BLD AUTO: 5.3 %
ERYTHROCYTE [DISTWIDTH] IN BLOOD BY AUTOMATED COUNT: 21.7 % (ref 11.5–14.5)
GLUCOSE SERPL-MCNC: 117 MG/DL (ref 74–99)
HCT VFR BLD AUTO: 30.1 % (ref 41–52)
HGB BLD-MCNC: 8.9 G/DL (ref 13.5–17.5)
HYPOCHROMIA BLD QL SMEAR: NORMAL
IMM GRANULOCYTES # BLD AUTO: 0.03 X10*3/UL (ref 0–0.7)
IMM GRANULOCYTES NFR BLD AUTO: 0.4 % (ref 0–0.9)
LYMPHOCYTES # BLD AUTO: 1.78 X10*3/UL (ref 1.2–4.8)
LYMPHOCYTES NFR BLD AUTO: 24.1 %
MCH RBC QN AUTO: 19.4 PG (ref 26–34)
MCHC RBC AUTO-ENTMCNC: 29.6 G/DL (ref 32–36)
MCV RBC AUTO: 66 FL (ref 80–100)
MONOCYTES # BLD AUTO: 0.54 X10*3/UL (ref 0.1–1)
MONOCYTES NFR BLD AUTO: 7.3 %
NEUTROPHILS # BLD AUTO: 4.56 X10*3/UL (ref 1.2–7.7)
NEUTROPHILS NFR BLD AUTO: 61.7 %
NRBC BLD-RTO: 0 /100 WBCS (ref 0–0)
OVALOCYTES BLD QL SMEAR: NORMAL
PLATELET # BLD AUTO: 463 X10*3/UL (ref 150–450)
POTASSIUM SERPL-SCNC: 4.5 MMOL/L (ref 3.5–5.3)
PROT SERPL-MCNC: 5.7 G/DL (ref 6.4–8.2)
PSA SERPL-MCNC: 0.46 NG/ML
RBC # BLD AUTO: 4.59 X10*6/UL (ref 4.5–5.9)
RBC MORPH BLD: NORMAL
SODIUM SERPL-SCNC: 140 MMOL/L (ref 136–145)
WBC # BLD AUTO: 7.4 X10*3/UL (ref 4.4–11.3)

## 2024-12-09 PROCEDURE — 85025 COMPLETE CBC W/AUTO DIFF WBC: CPT

## 2024-12-09 PROCEDURE — 36415 COLL VENOUS BLD VENIPUNCTURE: CPT

## 2024-12-09 PROCEDURE — 80053 COMPREHEN METABOLIC PANEL: CPT

## 2024-12-09 PROCEDURE — 84153 ASSAY OF PSA TOTAL: CPT

## 2025-02-02 DIAGNOSIS — F32.A DEPRESSION, UNSPECIFIED DEPRESSION TYPE: ICD-10-CM

## 2025-02-02 DIAGNOSIS — F31.9 BIPOLAR AND RELATED DISORDER (MULTI): ICD-10-CM

## 2025-02-03 RX ORDER — QUETIAPINE FUMARATE 200 MG/1
200 TABLET, FILM COATED ORAL NIGHTLY
Qty: 90 TABLET | Refills: 1 | Status: SHIPPED | OUTPATIENT
Start: 2025-02-03

## 2025-02-03 RX ORDER — VORTIOXETINE 20 MG/1
20 TABLET, FILM COATED ORAL DAILY
Qty: 90 TABLET | Refills: 1 | Status: SHIPPED | OUTPATIENT
Start: 2025-02-03

## 2025-02-14 ENCOUNTER — APPOINTMENT (OUTPATIENT)
Facility: CLINIC | Age: 50
End: 2025-02-14
Payer: COMMERCIAL

## 2025-02-24 ENCOUNTER — OFFICE VISIT (OUTPATIENT)
Dept: HEMATOLOGY/ONCOLOGY | Facility: CLINIC | Age: 50
End: 2025-02-24
Payer: COMMERCIAL

## 2025-02-24 VITALS
WEIGHT: 195.99 LBS | SYSTOLIC BLOOD PRESSURE: 166 MMHG | RESPIRATION RATE: 16 BRPM | DIASTOLIC BLOOD PRESSURE: 85 MMHG | HEART RATE: 67 BPM | TEMPERATURE: 96.1 F | HEIGHT: 67 IN | OXYGEN SATURATION: 98 % | BODY MASS INDEX: 30.76 KG/M2

## 2025-02-24 DIAGNOSIS — D64.9 ANEMIA, UNSPECIFIED TYPE: ICD-10-CM

## 2025-02-24 LAB
ALBUMIN SERPL BCP-MCNC: 4 G/DL (ref 3.4–5)
ALP SERPL-CCNC: 66 U/L (ref 33–120)
ALT SERPL W P-5'-P-CCNC: 8 U/L (ref 10–52)
ANION GAP SERPL CALC-SCNC: 10 MMOL/L (ref 10–20)
AST SERPL W P-5'-P-CCNC: 14 U/L (ref 9–39)
BASOPHILS # BLD AUTO: 0.08 X10*3/UL (ref 0–0.1)
BASOPHILS NFR BLD AUTO: 1 %
BILIRUB SERPL-MCNC: 0.5 MG/DL (ref 0–1.2)
BUN SERPL-MCNC: 11 MG/DL (ref 6–23)
CALCIUM SERPL-MCNC: 8.8 MG/DL (ref 8.6–10.3)
CHLORIDE SERPL-SCNC: 102 MMOL/L (ref 98–107)
CO2 SERPL-SCNC: 28 MMOL/L (ref 21–32)
CREAT SERPL-MCNC: 0.98 MG/DL (ref 0.5–1.3)
EGFRCR SERPLBLD CKD-EPI 2021: >90 ML/MIN/1.73M*2
EOSINOPHIL # BLD AUTO: 0.36 X10*3/UL (ref 0–0.7)
EOSINOPHIL NFR BLD AUTO: 4.7 %
ERYTHROCYTE [DISTWIDTH] IN BLOOD BY AUTOMATED COUNT: 17.6 % (ref 11.5–14.5)
FERRITIN SERPL-MCNC: 9 NG/ML (ref 20–300)
FOLATE SERPL-MCNC: 15.6 NG/ML
GLUCOSE SERPL-MCNC: 88 MG/DL (ref 74–99)
HCT VFR BLD AUTO: 28.4 % (ref 41–52)
HGB BLD-MCNC: 8.4 G/DL (ref 13.5–17.5)
HGB RETIC QN: 19 PG (ref 28–38)
IMM GRANULOCYTES # BLD AUTO: 0.02 X10*3/UL (ref 0–0.7)
IMM GRANULOCYTES NFR BLD AUTO: 0.3 % (ref 0–0.9)
IMMATURE RETIC FRACTION: 15.7 %
IRON SATN MFR SERPL: 3 % (ref 25–45)
IRON SERPL-MCNC: 15 UG/DL (ref 35–150)
LYMPHOCYTES # BLD AUTO: 2 X10*3/UL (ref 1.2–4.8)
LYMPHOCYTES NFR BLD AUTO: 26 %
MCH RBC QN AUTO: 19.3 PG (ref 26–34)
MCHC RBC AUTO-ENTMCNC: 29.6 G/DL (ref 32–36)
MCV RBC AUTO: 65 FL (ref 80–100)
MONOCYTES # BLD AUTO: 0.63 X10*3/UL (ref 0.1–1)
MONOCYTES NFR BLD AUTO: 8.2 %
NEUTROPHILS # BLD AUTO: 4.6 X10*3/UL (ref 1.2–7.7)
NEUTROPHILS NFR BLD AUTO: 59.8 %
NRBC BLD-RTO: ABNORMAL /100{WBCS}
PLATELET # BLD AUTO: 361 X10*3/UL (ref 150–450)
POTASSIUM SERPL-SCNC: 4.1 MMOL/L (ref 3.5–5.3)
PROT SERPL-MCNC: 6.3 G/DL (ref 6.4–8.2)
RBC # BLD AUTO: 4.35 X10*6/UL (ref 4.5–5.9)
RETICS #: 0.06 X10*6/UL (ref 0.02–0.12)
RETICS/RBC NFR AUTO: 1.3 % (ref 0.5–2)
SODIUM SERPL-SCNC: 136 MMOL/L (ref 136–145)
TIBC SERPL-MCNC: 462 UG/DL (ref 240–445)
TSH SERPL-ACNC: 0.59 MIU/L (ref 0.44–3.98)
UIBC SERPL-MCNC: 447 UG/DL (ref 110–370)
VIT B12 SERPL-MCNC: 376 PG/ML (ref 211–911)
WBC # BLD AUTO: 7.7 X10*3/UL (ref 4.4–11.3)

## 2025-02-24 PROCEDURE — 85025 COMPLETE CBC W/AUTO DIFF WBC: CPT | Performed by: NURSE PRACTITIONER

## 2025-02-24 PROCEDURE — 99204 OFFICE O/P NEW MOD 45 MIN: CPT | Performed by: NURSE PRACTITIONER

## 2025-02-24 PROCEDURE — 99214 OFFICE O/P EST MOD 30 MIN: CPT | Mod: 25 | Performed by: NURSE PRACTITIONER

## 2025-02-24 PROCEDURE — 84443 ASSAY THYROID STIM HORMONE: CPT | Performed by: NURSE PRACTITIONER

## 2025-02-24 PROCEDURE — 3079F DIAST BP 80-89 MM HG: CPT | Performed by: NURSE PRACTITIONER

## 2025-02-24 PROCEDURE — 82728 ASSAY OF FERRITIN: CPT | Performed by: NURSE PRACTITIONER

## 2025-02-24 PROCEDURE — 82668 ASSAY OF ERYTHROPOIETIN: CPT | Performed by: NURSE PRACTITIONER

## 2025-02-24 PROCEDURE — 36415 COLL VENOUS BLD VENIPUNCTURE: CPT | Performed by: NURSE PRACTITIONER

## 2025-02-24 PROCEDURE — 3077F SYST BP >= 140 MM HG: CPT | Performed by: NURSE PRACTITIONER

## 2025-02-24 PROCEDURE — 82607 VITAMIN B-12: CPT | Mod: PORLAB | Performed by: NURSE PRACTITIONER

## 2025-02-24 PROCEDURE — 83550 IRON BINDING TEST: CPT | Performed by: NURSE PRACTITIONER

## 2025-02-24 PROCEDURE — 82746 ASSAY OF FOLIC ACID SERUM: CPT | Mod: PORLAB | Performed by: NURSE PRACTITIONER

## 2025-02-24 PROCEDURE — 1036F TOBACCO NON-USER: CPT | Performed by: NURSE PRACTITIONER

## 2025-02-24 PROCEDURE — 85045 AUTOMATED RETICULOCYTE COUNT: CPT | Performed by: NURSE PRACTITIONER

## 2025-02-24 PROCEDURE — 3008F BODY MASS INDEX DOCD: CPT | Performed by: NURSE PRACTITIONER

## 2025-02-24 PROCEDURE — 80053 COMPREHEN METABOLIC PANEL: CPT | Performed by: NURSE PRACTITIONER

## 2025-02-24 ASSESSMENT — PAIN SCALES - GENERAL: PAINLEVEL_OUTOF10: 0-NO PAIN

## 2025-02-24 NOTE — PATIENT INSTRUCTIONS
New patient visit today with Leanna for Anemia    Leanna ordered multiple lab work to be collected today    You will be called later today or tomorrow with results and follow up plan for treatment as needed

## 2025-02-26 LAB
EPO SERPL-ACNC: 86 MU/ML (ref 4–27)
EPO SERPL-ACNC: 89 MU/ML (ref 4–27)

## 2025-02-27 DIAGNOSIS — E53.8 VITAMIN B12 DEFICIENCY: Primary | ICD-10-CM

## 2025-02-27 DIAGNOSIS — E53.8 VITAMIN B12 DEFICIENCY: ICD-10-CM

## 2025-02-27 DIAGNOSIS — D64.9 ANEMIA, UNSPECIFIED TYPE: ICD-10-CM

## 2025-02-27 DIAGNOSIS — K90.9 MALABSORPTION OF IRON (HHS-HCC): Primary | ICD-10-CM

## 2025-02-27 RX ORDER — CYANOCOBALAMIN 1000 UG/ML
1000 INJECTION, SOLUTION INTRAMUSCULAR; SUBCUTANEOUS ONCE
OUTPATIENT
Start: 2025-03-11

## 2025-02-27 RX ORDER — FAMOTIDINE 10 MG/ML
20 INJECTION, SOLUTION INTRAVENOUS ONCE AS NEEDED
OUTPATIENT
Start: 2025-03-11

## 2025-02-27 RX ORDER — ALBUTEROL SULFATE 0.83 MG/ML
3 SOLUTION RESPIRATORY (INHALATION) AS NEEDED
OUTPATIENT
Start: 2025-03-11

## 2025-02-27 RX ORDER — EPINEPHRINE 0.3 MG/.3ML
0.3 INJECTION SUBCUTANEOUS EVERY 5 MIN PRN
OUTPATIENT
Start: 2025-03-11

## 2025-02-27 RX ORDER — DIPHENHYDRAMINE HYDROCHLORIDE 50 MG/ML
50 INJECTION INTRAMUSCULAR; INTRAVENOUS AS NEEDED
OUTPATIENT
Start: 2025-03-11

## 2025-02-28 ENCOUNTER — TELEPHONE (OUTPATIENT)
Dept: HEMATOLOGY/ONCOLOGY | Facility: CLINIC | Age: 50
End: 2025-02-28
Payer: COMMERCIAL

## 2025-02-28 DIAGNOSIS — K90.9 MALABSORPTION OF IRON (HHS-HCC): ICD-10-CM

## 2025-02-28 DIAGNOSIS — D64.9 ANEMIA, UNSPECIFIED TYPE: ICD-10-CM

## 2025-02-28 DIAGNOSIS — E53.8 VITAMIN B12 DEFICIENCY: ICD-10-CM

## 2025-02-28 NOTE — TELEPHONE ENCOUNTER
Orders received from Leanna to schedule Joshua for iv Iron and B12    Spoke with Joshua when he returned my call. Informed him that his iron studies were low and he would have to be scheduled for iv iron and subcutaneous B12 injections.  Joshua was agreeable and willing to be scheduled for infusions.   Leanna entered orders and Joshua will start on 2/28.

## 2025-03-01 PROBLEM — D50.0 IRON DEFICIENCY ANEMIA DUE TO CHRONIC BLOOD LOSS: Status: ACTIVE | Noted: 2025-03-01

## 2025-03-01 RX ORDER — HEPARIN 100 UNIT/ML
500 SYRINGE INTRAVENOUS AS NEEDED
OUTPATIENT
Start: 2025-03-01

## 2025-03-01 RX ORDER — HEPARIN SODIUM,PORCINE/PF 10 UNIT/ML
50 SYRINGE (ML) INTRAVENOUS AS NEEDED
OUTPATIENT
Start: 2025-03-01

## 2025-03-11 ENCOUNTER — INFUSION (OUTPATIENT)
Dept: HEMATOLOGY/ONCOLOGY | Facility: CLINIC | Age: 50
End: 2025-03-11
Payer: COMMERCIAL

## 2025-03-11 VITALS
SYSTOLIC BLOOD PRESSURE: 180 MMHG | DIASTOLIC BLOOD PRESSURE: 92 MMHG | OXYGEN SATURATION: 100 % | HEART RATE: 68 BPM | WEIGHT: 195.4 LBS | RESPIRATION RATE: 16 BRPM | BODY MASS INDEX: 30.42 KG/M2 | TEMPERATURE: 98.1 F

## 2025-03-11 DIAGNOSIS — D64.9 ANEMIA, UNSPECIFIED TYPE: ICD-10-CM

## 2025-03-11 DIAGNOSIS — K90.9 MALABSORPTION OF IRON (HHS-HCC): ICD-10-CM

## 2025-03-11 DIAGNOSIS — E53.8 VITAMIN B12 DEFICIENCY: ICD-10-CM

## 2025-03-11 DIAGNOSIS — D50.0 IRON DEFICIENCY ANEMIA DUE TO CHRONIC BLOOD LOSS: ICD-10-CM

## 2025-03-11 LAB
ABO GROUP (TYPE) IN BLOOD: NORMAL
ANTIBODY SCREEN: NORMAL
ERYTHROCYTE [DISTWIDTH] IN BLOOD BY AUTOMATED COUNT: 17.5 % (ref 11.5–14.5)
HCT VFR BLD AUTO: 27.7 % (ref 41–52)
HGB BLD-MCNC: 8.2 G/DL (ref 13.5–17.5)
MCH RBC QN AUTO: 19.2 PG (ref 26–34)
MCHC RBC AUTO-ENTMCNC: 29.6 G/DL (ref 32–36)
MCV RBC AUTO: 65 FL (ref 80–100)
PLATELET # BLD AUTO: 460 X10*3/UL (ref 150–450)
RBC # BLD AUTO: 4.27 X10*6/UL (ref 4.5–5.9)
RH FACTOR (ANTIGEN D): NORMAL
WBC # BLD AUTO: 8.2 X10*3/UL (ref 4.4–11.3)

## 2025-03-11 PROCEDURE — 85027 COMPLETE CBC AUTOMATED: CPT

## 2025-03-11 PROCEDURE — 86900 BLOOD TYPING SEROLOGIC ABO: CPT

## 2025-03-11 PROCEDURE — 2500000004 HC RX 250 GENERAL PHARMACY W/ HCPCS (ALT 636 FOR OP/ED): Performed by: NURSE PRACTITIONER

## 2025-03-11 PROCEDURE — 96372 THER/PROPH/DIAG INJ SC/IM: CPT

## 2025-03-11 PROCEDURE — 96365 THER/PROPH/DIAG IV INF INIT: CPT | Mod: INF

## 2025-03-11 RX ORDER — FAMOTIDINE 10 MG/ML
20 INJECTION, SOLUTION INTRAVENOUS ONCE AS NEEDED
OUTPATIENT
Start: 2025-03-18

## 2025-03-11 RX ORDER — EPINEPHRINE 0.3 MG/.3ML
0.3 INJECTION SUBCUTANEOUS EVERY 5 MIN PRN
OUTPATIENT
Start: 2025-03-18

## 2025-03-11 RX ORDER — CYANOCOBALAMIN 1000 UG/ML
1000 INJECTION, SOLUTION INTRAMUSCULAR; SUBCUTANEOUS ONCE
Status: COMPLETED | OUTPATIENT
Start: 2025-03-11 | End: 2025-03-11

## 2025-03-11 RX ORDER — EPINEPHRINE 0.3 MG/.3ML
0.3 INJECTION SUBCUTANEOUS EVERY 5 MIN PRN
OUTPATIENT
Start: 2025-04-08

## 2025-03-11 RX ORDER — HEPARIN SODIUM,PORCINE/PF 10 UNIT/ML
50 SYRINGE (ML) INTRAVENOUS AS NEEDED
OUTPATIENT
Start: 2025-03-11

## 2025-03-11 RX ORDER — ALBUTEROL SULFATE 0.83 MG/ML
3 SOLUTION RESPIRATORY (INHALATION) AS NEEDED
Status: DISCONTINUED | OUTPATIENT
Start: 2025-03-11 | End: 2025-03-11 | Stop reason: HOSPADM

## 2025-03-11 RX ORDER — DIPHENHYDRAMINE HYDROCHLORIDE 50 MG/ML
50 INJECTION INTRAMUSCULAR; INTRAVENOUS AS NEEDED
OUTPATIENT
Start: 2025-03-18

## 2025-03-11 RX ORDER — ALBUTEROL SULFATE 0.83 MG/ML
3 SOLUTION RESPIRATORY (INHALATION) AS NEEDED
OUTPATIENT
Start: 2025-04-08

## 2025-03-11 RX ORDER — ALBUTEROL SULFATE 0.83 MG/ML
3 SOLUTION RESPIRATORY (INHALATION) AS NEEDED
OUTPATIENT
Start: 2025-03-18

## 2025-03-11 RX ORDER — CYANOCOBALAMIN 1000 UG/ML
1000 INJECTION, SOLUTION INTRAMUSCULAR; SUBCUTANEOUS ONCE
OUTPATIENT
Start: 2025-04-08

## 2025-03-11 RX ORDER — HEPARIN 100 UNIT/ML
500 SYRINGE INTRAVENOUS AS NEEDED
OUTPATIENT
Start: 2025-03-11

## 2025-03-11 RX ORDER — DIPHENHYDRAMINE HYDROCHLORIDE 50 MG/ML
50 INJECTION INTRAMUSCULAR; INTRAVENOUS AS NEEDED
OUTPATIENT
Start: 2025-04-08

## 2025-03-11 RX ORDER — EPINEPHRINE 0.3 MG/.3ML
0.3 INJECTION SUBCUTANEOUS EVERY 5 MIN PRN
Status: DISCONTINUED | OUTPATIENT
Start: 2025-03-11 | End: 2025-03-11 | Stop reason: HOSPADM

## 2025-03-11 RX ORDER — FAMOTIDINE 10 MG/ML
20 INJECTION, SOLUTION INTRAVENOUS ONCE AS NEEDED
OUTPATIENT
Start: 2025-04-08

## 2025-03-11 RX ORDER — FAMOTIDINE 10 MG/ML
20 INJECTION, SOLUTION INTRAVENOUS ONCE AS NEEDED
Status: DISCONTINUED | OUTPATIENT
Start: 2025-03-11 | End: 2025-03-11 | Stop reason: HOSPADM

## 2025-03-11 RX ORDER — DIPHENHYDRAMINE HYDROCHLORIDE 50 MG/ML
50 INJECTION INTRAMUSCULAR; INTRAVENOUS AS NEEDED
Status: DISCONTINUED | OUTPATIENT
Start: 2025-03-11 | End: 2025-03-11 | Stop reason: HOSPADM

## 2025-03-11 RX ADMIN — CYANOCOBALAMIN 1000 MCG: 1000 INJECTION, SOLUTION INTRAMUSCULAR at 13:56

## 2025-03-11 RX ADMIN — IRON SUCROSE 300 MG: 20 INJECTION, SOLUTION INTRAVENOUS at 14:19

## 2025-03-11 ASSESSMENT — PAIN SCALES - GENERAL: PAINLEVEL_OUTOF10: 0-NO PAIN

## 2025-03-11 NOTE — PROGRESS NOTES
Pt arrived awake, alert, oriented, no apparent distress with unlabored breaths. Pt reports feeling well today without s/sx of acute illness. Pt here for first doses of Q 28 day B12 injection, and the first of 3 ordered weekly iron sucrose infusions. Pt observed post infusion x 30 minutes with noted increase to BP. Dr Duarte notified. Pt denies hypertensive symptoms at this time, Dr Duarte verbalized ok for pt to be discharged with pt advised to seek higher level of care for developing/concerning s/sx. Pt next iron sucrose infusion appointment scheduled for 3/18/25, no further questions or concerns at this time, discharged in stable condition.

## 2025-03-18 ENCOUNTER — APPOINTMENT (OUTPATIENT)
Dept: HEMATOLOGY/ONCOLOGY | Facility: CLINIC | Age: 50
End: 2025-03-18
Payer: COMMERCIAL

## 2025-03-25 ENCOUNTER — APPOINTMENT (OUTPATIENT)
Dept: HEMATOLOGY/ONCOLOGY | Facility: CLINIC | Age: 50
End: 2025-03-25
Payer: COMMERCIAL

## 2025-03-27 ENCOUNTER — APPOINTMENT (OUTPATIENT)
Dept: HEMATOLOGY/ONCOLOGY | Facility: CLINIC | Age: 50
End: 2025-03-27
Payer: COMMERCIAL

## 2025-04-07 DIAGNOSIS — E53.8 VITAMIN B12 DEFICIENCY: Primary | ICD-10-CM

## 2025-04-07 DIAGNOSIS — K90.9 MALABSORPTION OF IRON (HHS-HCC): Primary | ICD-10-CM

## 2025-04-07 RX ORDER — CYANOCOBALAMIN 1000 UG/ML
1000 INJECTION, SOLUTION INTRAMUSCULAR; SUBCUTANEOUS ONCE
OUTPATIENT
Start: 2025-04-22

## 2025-04-07 RX ORDER — FAMOTIDINE 10 MG/ML
20 INJECTION, SOLUTION INTRAVENOUS ONCE AS NEEDED
OUTPATIENT
Start: 2025-04-22

## 2025-04-07 RX ORDER — FAMOTIDINE 10 MG/ML
20 INJECTION, SOLUTION INTRAVENOUS ONCE AS NEEDED
OUTPATIENT
Start: 2025-04-11

## 2025-04-07 RX ORDER — ALBUTEROL SULFATE 0.83 MG/ML
3 SOLUTION RESPIRATORY (INHALATION) AS NEEDED
OUTPATIENT
Start: 2025-04-22

## 2025-04-07 RX ORDER — DIPHENHYDRAMINE HYDROCHLORIDE 50 MG/ML
50 INJECTION, SOLUTION INTRAMUSCULAR; INTRAVENOUS AS NEEDED
OUTPATIENT
Start: 2025-04-22

## 2025-04-07 RX ORDER — EPINEPHRINE 0.3 MG/.3ML
0.3 INJECTION SUBCUTANEOUS EVERY 5 MIN PRN
OUTPATIENT
Start: 2025-04-22

## 2025-04-07 RX ORDER — EPINEPHRINE 0.3 MG/.3ML
0.3 INJECTION SUBCUTANEOUS EVERY 5 MIN PRN
OUTPATIENT
Start: 2025-04-11

## 2025-04-07 RX ORDER — ALBUTEROL SULFATE 0.83 MG/ML
3 SOLUTION RESPIRATORY (INHALATION) AS NEEDED
OUTPATIENT
Start: 2025-04-11

## 2025-04-07 RX ORDER — DIPHENHYDRAMINE HYDROCHLORIDE 50 MG/ML
50 INJECTION, SOLUTION INTRAMUSCULAR; INTRAVENOUS AS NEEDED
OUTPATIENT
Start: 2025-04-11

## 2025-04-08 ENCOUNTER — APPOINTMENT (OUTPATIENT)
Dept: HEMATOLOGY/ONCOLOGY | Facility: CLINIC | Age: 50
End: 2025-04-08
Payer: COMMERCIAL

## 2025-04-24 ENCOUNTER — APPOINTMENT (OUTPATIENT)
Dept: PRIMARY CARE | Facility: CLINIC | Age: 50
End: 2025-04-24
Payer: COMMERCIAL

## 2025-05-06 ENCOUNTER — APPOINTMENT (OUTPATIENT)
Dept: HEMATOLOGY/ONCOLOGY | Facility: CLINIC | Age: 50
End: 2025-05-06
Payer: COMMERCIAL

## 2025-05-07 DIAGNOSIS — I10 PRIMARY HYPERTENSION: ICD-10-CM

## 2025-05-07 RX ORDER — NEBIVOLOL 20 MG/1
20 TABLET ORAL DAILY
Qty: 90 TABLET | Refills: 0 | Status: SHIPPED | OUTPATIENT
Start: 2025-05-07

## 2025-05-20 ENCOUNTER — APPOINTMENT (OUTPATIENT)
Dept: HEMATOLOGY/ONCOLOGY | Facility: CLINIC | Age: 50
End: 2025-05-20
Payer: COMMERCIAL

## 2025-06-17 ENCOUNTER — APPOINTMENT (OUTPATIENT)
Dept: HEMATOLOGY/ONCOLOGY | Facility: CLINIC | Age: 50
End: 2025-06-17
Payer: COMMERCIAL

## 2025-07-03 ENCOUNTER — APPOINTMENT (OUTPATIENT)
Dept: PRIMARY CARE | Facility: CLINIC | Age: 50
End: 2025-07-03
Payer: COMMERCIAL

## 2025-07-03 VITALS
TEMPERATURE: 96.9 F | DIASTOLIC BLOOD PRESSURE: 84 MMHG | HEART RATE: 65 BPM | OXYGEN SATURATION: 98 % | RESPIRATION RATE: 20 BRPM | BODY MASS INDEX: 30.06 KG/M2 | SYSTOLIC BLOOD PRESSURE: 138 MMHG | HEIGHT: 67 IN | WEIGHT: 191.5 LBS

## 2025-07-03 DIAGNOSIS — F31.9 BIPOLAR AND RELATED DISORDER (MULTI): ICD-10-CM

## 2025-07-03 DIAGNOSIS — I10 PRIMARY HYPERTENSION: Primary | ICD-10-CM

## 2025-07-03 DIAGNOSIS — K21.9 GASTROESOPHAGEAL REFLUX DISEASE WITHOUT ESOPHAGITIS: ICD-10-CM

## 2025-07-03 DIAGNOSIS — F32.A DEPRESSION, UNSPECIFIED DEPRESSION TYPE: ICD-10-CM

## 2025-07-03 PROCEDURE — 99214 OFFICE O/P EST MOD 30 MIN: CPT | Performed by: STUDENT IN AN ORGANIZED HEALTH CARE EDUCATION/TRAINING PROGRAM

## 2025-07-03 PROCEDURE — 3075F SYST BP GE 130 - 139MM HG: CPT | Performed by: STUDENT IN AN ORGANIZED HEALTH CARE EDUCATION/TRAINING PROGRAM

## 2025-07-03 PROCEDURE — 3008F BODY MASS INDEX DOCD: CPT | Performed by: STUDENT IN AN ORGANIZED HEALTH CARE EDUCATION/TRAINING PROGRAM

## 2025-07-03 PROCEDURE — 1036F TOBACCO NON-USER: CPT | Performed by: STUDENT IN AN ORGANIZED HEALTH CARE EDUCATION/TRAINING PROGRAM

## 2025-07-03 PROCEDURE — 3079F DIAST BP 80-89 MM HG: CPT | Performed by: STUDENT IN AN ORGANIZED HEALTH CARE EDUCATION/TRAINING PROGRAM

## 2025-07-03 RX ORDER — NEBIVOLOL 20 MG/1
20 TABLET ORAL DAILY
Qty: 90 TABLET | Refills: 1 | Status: SHIPPED | OUTPATIENT
Start: 2025-07-03

## 2025-07-03 RX ORDER — QUETIAPINE FUMARATE 200 MG/1
200 TABLET, FILM COATED ORAL NIGHTLY
Qty: 90 TABLET | Refills: 1 | Status: SHIPPED | OUTPATIENT
Start: 2025-07-03

## 2025-07-03 RX ORDER — OLMESARTAN MEDOXOMIL 40 MG/1
40 TABLET ORAL DAILY
Qty: 90 TABLET | Refills: 1 | Status: SHIPPED | OUTPATIENT
Start: 2025-07-03

## 2025-07-03 RX ORDER — PANTOPRAZOLE SODIUM 40 MG/1
40 TABLET, DELAYED RELEASE ORAL DAILY
Qty: 90 TABLET | Refills: 1 | Status: SHIPPED | OUTPATIENT
Start: 2025-07-03

## 2025-07-03 SDOH — ECONOMIC STABILITY: FOOD INSECURITY: WITHIN THE PAST 12 MONTHS, YOU WORRIED THAT YOUR FOOD WOULD RUN OUT BEFORE YOU GOT MONEY TO BUY MORE.: NEVER TRUE

## 2025-07-03 SDOH — ECONOMIC STABILITY: FOOD INSECURITY: WITHIN THE PAST 12 MONTHS, THE FOOD YOU BOUGHT JUST DIDN'T LAST AND YOU DIDN'T HAVE MONEY TO GET MORE.: NEVER TRUE

## 2025-07-03 ASSESSMENT — ENCOUNTER SYMPTOMS
OCCASIONAL FEELINGS OF UNSTEADINESS: 0
DIZZINESS: 0
FATIGUE: 0
NAUSEA: 0
WHEEZING: 0
DEPRESSION: 0
ABDOMINAL PAIN: 0
CONFUSION: 0
DIARRHEA: 0
SHORTNESS OF BREATH: 0
FEVER: 0
COLOR CHANGE: 0
COUGH: 0
MUSCULOSKELETAL NEGATIVE: 1
UNEXPECTED WEIGHT CHANGE: 0
PALPITATIONS: 0
VOMITING: 0
CONSTIPATION: 0
CHILLS: 0
HEADACHES: 0
LOSS OF SENSATION IN FEET: 0

## 2025-07-03 ASSESSMENT — PAIN SCALES - GENERAL: PAINLEVEL_OUTOF10: 0-NO PAIN

## 2025-07-03 ASSESSMENT — PATIENT HEALTH QUESTIONNAIRE - PHQ9
SUM OF ALL RESPONSES TO PHQ9 QUESTIONS 1 & 2: 0
1. LITTLE INTEREST OR PLEASURE IN DOING THINGS: NOT AT ALL
2. FEELING DOWN, DEPRESSED OR HOPELESS: NOT AT ALL

## 2025-07-03 ASSESSMENT — LIFESTYLE VARIABLES
HOW MANY STANDARD DRINKS CONTAINING ALCOHOL DO YOU HAVE ON A TYPICAL DAY: PATIENT DOES NOT DRINK
HOW OFTEN DO YOU HAVE SIX OR MORE DRINKS ON ONE OCCASION: NEVER
AUDIT-C TOTAL SCORE: 0
SKIP TO QUESTIONS 9-10: 1
HOW OFTEN DO YOU HAVE A DRINK CONTAINING ALCOHOL: NEVER

## 2025-07-03 ASSESSMENT — ANXIETY QUESTIONNAIRES
3. WORRYING TOO MUCH ABOUT DIFFERENT THINGS: NOT AT ALL
4. TROUBLE RELAXING: NOT AT ALL
5. BEING SO RESTLESS THAT IT IS HARD TO SIT STILL: NOT AT ALL
2. NOT BEING ABLE TO STOP OR CONTROL WORRYING: NOT AT ALL
7. FEELING AFRAID AS IF SOMETHING AWFUL MIGHT HAPPEN: NOT AT ALL
6. BECOMING EASILY ANNOYED OR IRRITABLE: NOT AT ALL
GAD7 TOTAL SCORE: 0
1. FEELING NERVOUS, ANXIOUS, OR ON EDGE: NOT AT ALL
IF YOU CHECKED OFF ANY PROBLEMS ON THIS QUESTIONNAIRE, HOW DIFFICULT HAVE THESE PROBLEMS MADE IT FOR YOU TO DO YOUR WORK, TAKE CARE OF THINGS AT HOME, OR GET ALONG WITH OTHER PEOPLE: NOT DIFFICULT AT ALL

## 2025-07-03 NOTE — PROGRESS NOTES
"Subjective   Patient ID: Joshua Padilla is a 49 y.o. male who presents for Follow-up (6  month follow up ).    HPI   He is here for FU visit. Reports he is doing okay except feeling some dizzy; has severe anemia and following with Heme/Onc and rec'd one dose of iron infusion; wasn't able to go for repeat as [place] got stuck [place]. States he has upcoming appt with them on 07/15/25. Reports still having intermittent bld in the stool. He also has IBS-D and was supposed to follow up with GI but cancelled as he wasn't here in town; also was advised to get repeat c'scopy in 11/2024 and planning to do soon.       # HTN   - io BP was 138/84  - takes benicar 40 mg & bystolic 20 mg    - chart showed some wt gain, about 6 lbs since LOV (12/24)     # Depression # Bipolar disorder   - reports stable mood; takes seroquel 200 mg and trintellix 20 mg daily. Req refills      # GERD   - reports stable reflux sx; takes PPI 40 mg daily      Review of Systems   Constitutional:  Negative for chills, fatigue, fever and unexpected weight change.   HENT: Negative.     Respiratory:  Negative for cough, shortness of breath and wheezing.    Cardiovascular:  Negative for chest pain, palpitations and leg swelling.   Gastrointestinal:  Negative for abdominal pain, constipation, diarrhea, nausea and vomiting.   Musculoskeletal: Negative.    Skin:  Negative for color change and rash.   Neurological:  Negative for dizziness and headaches.   Psychiatric/Behavioral:  Negative for behavioral problems and confusion.        Objective   /84 (BP Location: Left arm, Patient Position: Sitting, BP Cuff Size: Adult)   Pulse 65   Temp 36.1 °C (96.9 °F) (Temporal)   Resp 20   Ht 1.702 m (5' 7\")   Wt 86.9 kg (191 lb 8 oz)   SpO2 98%   BMI 29.99 kg/m²     Physical Exam  Vitals and nursing note reviewed.   Constitutional:       Appearance: Normal appearance.      Comments: Wife in the room   Cardiovascular:      Rate and Rhythm: Normal rate and regular " rhythm.      Pulses: Normal pulses.      Heart sounds: Normal heart sounds.   Pulmonary:      Effort: Pulmonary effort is normal.      Breath sounds: Normal breath sounds.   Abdominal:      General: Abdomen is flat. Bowel sounds are normal.      Palpations: Abdomen is soft.   Musculoskeletal:         General: Normal range of motion.   Neurological:      General: No focal deficit present.      Mental Status: He is alert.   Psychiatric:         Mood and Affect: Mood normal.         Behavior: Behavior normal.         Assessment/Plan   He is here for FU visit.  His ongoing intermittent dizziness likely secondary to anemia, highly encouraged to follow-up with heme-onc as a schedule and go for iron infusion as recommended.  Advised to do blood work ordered by heme-onc at last visit.  Also encouraged to make appointment with GI due to ongoing diarrhea, blood in the stool and also over due for repeat colonoscopy (was supposed to get in 1 year,11/24).  Patient will call us if he needs help to schedule any of this appointment.  For now, recommend fiber supplements and Imodium as needed.  Other plan as follows    # HTN   - BP remains close to goal  - Continue benicar 40 mg & bystolic 20 mg    - encourage heart healthy & DASH diet; continue exercise as tolerated, at least 150 mins per wk      # Depression # Bipolar disorder   - very stable mood; continue seroquel 200 mg and trintellix 20 mg daily.  Rx refilled  - Continue home relaxation and mindfulness activities    # GERD   - stable reflux sx; continue PPI 40 mg daily as usual.  Rx refilled    Problem List Items Addressed This Visit           ICD-10-CM    GERD (gastroesophageal reflux disease) K21.9    Relevant Medications    pantoprazole (ProtoNix) 40 mg EC tablet     Other Visit Diagnoses         Codes      Primary hypertension    -  Primary I10    Relevant Medications    nebivolol (Bystolic) 20 mg tablet    olmesartan (BENIcar) 40 mg tablet      Bipolar and related  disorder (Multi)     F31.9    Relevant Medications    QUEtiapine (SEROquel) 200 mg tablet    vortioxetine (Trintellix) 20 mg tablet tablet      Depression, unspecified depression type     F32.A    Relevant Medications    QUEtiapine (SEROquel) 200 mg tablet    vortioxetine (Trintellix) 20 mg tablet tablet          RTC 6 months for HM/follow-up    This note was partially generated using the Dragon Voice recognition system. There may be some incorrect wording, spelling and/or spelling errors or punctuation errors that were not corrected prior to committing the note to the medical record.      Buck Villarreal MD    [facility]        Medical Necessity Information: It is in your best interest to select a reason for this procedure from the list below. All of these items fulfill various CMS LCD requirements except the new and changing color options. Medical Necessity Clause: This procedure was medically necessary because the lesion that was treated was: Lab: -21 Lab Facility: 3 Detail Level: Detailed Was A Bandage Applied: Yes Size Of Lesion In Cm (Required): 3.5 X Size Of Lesion In Cm (Optional): 0 Biopsy Method: curette Anesthesia Type: 1% lidocaine with epinephrine Anesthesia Volume In Cc: 2 Hemostasis: Drysol Wound Care: Petrolatum Render Path Notes In Note?: No Consent was obtained from the patient. The risks and benefits to therapy were discussed in detail. Specifically, the risks of infection, scarring, bleeding, prolonged wound healing, incomplete removal, allergy to anesthesia, nerve injury and recurrence were addressed. Prior to the procedure, the treatment site was clearly identified and confirmed by the patient. All components of Universal Protocol/PAUSE Rule completed. Post-Care Instructions: I reviewed with the patient in detail post-care instructions. Patient is to keep the biopsy site dry overnight, and then apply bacitracin twice daily until healed. Patient may apply hydrogen peroxide soaks to remove any crusting. Notification Instructions: Patient will be notified of pathology results. However, patient instructed to call the office if not contacted within 2 weeks. Billing Type: Third-Party Bill

## 2025-07-14 DIAGNOSIS — D50.8 OTHER IRON DEFICIENCY ANEMIA: ICD-10-CM

## 2025-07-14 DIAGNOSIS — E53.8 VITAMIN B12 DEFICIENCY: Primary | ICD-10-CM

## 2025-07-14 RX ORDER — EPINEPHRINE 0.3 MG/.3ML
0.3 INJECTION SUBCUTANEOUS EVERY 5 MIN PRN
Status: CANCELLED | OUTPATIENT
Start: 2025-07-15

## 2025-07-14 RX ORDER — ALBUTEROL SULFATE 0.83 MG/ML
3 SOLUTION RESPIRATORY (INHALATION) AS NEEDED
Status: CANCELLED | OUTPATIENT
Start: 2025-07-15

## 2025-07-14 RX ORDER — FAMOTIDINE 10 MG/ML
20 INJECTION, SOLUTION INTRAVENOUS ONCE AS NEEDED
Status: CANCELLED | OUTPATIENT
Start: 2025-07-15

## 2025-07-14 RX ORDER — DIPHENHYDRAMINE HYDROCHLORIDE 50 MG/ML
50 INJECTION, SOLUTION INTRAMUSCULAR; INTRAVENOUS AS NEEDED
Status: CANCELLED | OUTPATIENT
Start: 2025-07-15

## 2025-07-14 RX ORDER — CYANOCOBALAMIN 1000 UG/ML
1000 INJECTION, SOLUTION INTRAMUSCULAR; SUBCUTANEOUS ONCE
Status: CANCELLED | OUTPATIENT
Start: 2025-07-15

## 2025-07-15 ENCOUNTER — INFUSION (OUTPATIENT)
Dept: HEMATOLOGY/ONCOLOGY | Facility: CLINIC | Age: 50
End: 2025-07-15
Payer: COMMERCIAL

## 2025-07-15 VITALS
DIASTOLIC BLOOD PRESSURE: 81 MMHG | HEART RATE: 57 BPM | BODY MASS INDEX: 29.91 KG/M2 | SYSTOLIC BLOOD PRESSURE: 138 MMHG | WEIGHT: 190.6 LBS | OXYGEN SATURATION: 97 % | HEIGHT: 67 IN | RESPIRATION RATE: 16 BRPM | TEMPERATURE: 97.7 F

## 2025-07-15 DIAGNOSIS — D50.8 OTHER IRON DEFICIENCY ANEMIA: ICD-10-CM

## 2025-07-15 DIAGNOSIS — D64.9 ANEMIA, UNSPECIFIED TYPE: ICD-10-CM

## 2025-07-15 DIAGNOSIS — E53.8 VITAMIN B12 DEFICIENCY: ICD-10-CM

## 2025-07-15 LAB
ALBUMIN SERPL BCP-MCNC: 4.1 G/DL (ref 3.4–5)
ALP SERPL-CCNC: 55 U/L (ref 33–120)
ALT SERPL W P-5'-P-CCNC: 10 U/L (ref 10–52)
ANION GAP SERPL CALC-SCNC: 10 MMOL/L (ref 10–20)
AST SERPL W P-5'-P-CCNC: 14 U/L (ref 9–39)
BASOPHILS # BLD AUTO: 0.05 X10*3/UL (ref 0–0.1)
BASOPHILS NFR BLD AUTO: 0.7 %
BILIRUB SERPL-MCNC: 0.4 MG/DL (ref 0–1.2)
BUN SERPL-MCNC: 13 MG/DL (ref 6–23)
CALCIUM SERPL-MCNC: 9 MG/DL (ref 8.6–10.3)
CHLORIDE SERPL-SCNC: 103 MMOL/L (ref 98–107)
CO2 SERPL-SCNC: 29 MMOL/L (ref 21–32)
CREAT SERPL-MCNC: 1.02 MG/DL (ref 0.5–1.3)
EGFRCR SERPLBLD CKD-EPI 2021: 90 ML/MIN/1.73M*2
EOSINOPHIL # BLD AUTO: 0.38 X10*3/UL (ref 0–0.7)
EOSINOPHIL NFR BLD AUTO: 5.5 %
ERYTHROCYTE [DISTWIDTH] IN BLOOD BY AUTOMATED COUNT: 19.5 % (ref 11.5–14.5)
FERRITIN SERPL-MCNC: 26 NG/ML (ref 20–300)
FOLATE SERPL-MCNC: 19.3 NG/ML
GLUCOSE SERPL-MCNC: 97 MG/DL (ref 74–99)
HCT VFR BLD AUTO: 33 % (ref 41–52)
HGB BLD-MCNC: 10.3 G/DL (ref 13.5–17.5)
IMM GRANULOCYTES # BLD AUTO: 0.02 X10*3/UL (ref 0–0.7)
IMM GRANULOCYTES NFR BLD AUTO: 0.3 % (ref 0–0.9)
IRON SATN MFR SERPL: 7 % (ref 25–45)
IRON SERPL-MCNC: 32 UG/DL (ref 35–150)
LYMPHOCYTES # BLD AUTO: 1.91 X10*3/UL (ref 1.2–4.8)
LYMPHOCYTES NFR BLD AUTO: 27.4 %
MCH RBC QN AUTO: 21.4 PG (ref 26–34)
MCHC RBC AUTO-ENTMCNC: 31.2 G/DL (ref 32–36)
MCV RBC AUTO: 69 FL (ref 80–100)
MONOCYTES # BLD AUTO: 0.46 X10*3/UL (ref 0.1–1)
MONOCYTES NFR BLD AUTO: 6.6 %
NEUTROPHILS # BLD AUTO: 4.14 X10*3/UL (ref 1.2–7.7)
NEUTROPHILS NFR BLD AUTO: 59.5 %
PLATELET # BLD AUTO: 426 X10*3/UL (ref 150–450)
POTASSIUM SERPL-SCNC: 4.2 MMOL/L (ref 3.5–5.3)
PROT SERPL-MCNC: 6.8 G/DL (ref 6.4–8.2)
PROT SERPL-MCNC: 6.9 G/DL (ref 6.4–8.2)
RBC # BLD AUTO: 4.81 X10*6/UL (ref 4.5–5.9)
SODIUM SERPL-SCNC: 138 MMOL/L (ref 136–145)
TIBC SERPL-MCNC: 482 UG/DL (ref 240–445)
UIBC SERPL-MCNC: 450 UG/DL (ref 110–370)
VIT B12 SERPL-MCNC: 791 PG/ML (ref 211–911)
WBC # BLD AUTO: 7 X10*3/UL (ref 4.4–11.3)

## 2025-07-15 PROCEDURE — 84155 ASSAY OF PROTEIN SERUM: CPT | Mod: PORLAB

## 2025-07-15 PROCEDURE — 36415 COLL VENOUS BLD VENIPUNCTURE: CPT

## 2025-07-15 PROCEDURE — 83540 ASSAY OF IRON: CPT

## 2025-07-15 PROCEDURE — 80053 COMPREHEN METABOLIC PANEL: CPT

## 2025-07-15 PROCEDURE — 82746 ASSAY OF FOLIC ACID SERUM: CPT | Mod: PORLAB

## 2025-07-15 PROCEDURE — 96372 THER/PROPH/DIAG INJ SC/IM: CPT

## 2025-07-15 PROCEDURE — 82607 VITAMIN B-12: CPT | Mod: PORLAB

## 2025-07-15 PROCEDURE — 85025 COMPLETE CBC W/AUTO DIFF WBC: CPT

## 2025-07-15 PROCEDURE — 82728 ASSAY OF FERRITIN: CPT

## 2025-07-15 PROCEDURE — 84165 PROTEIN E-PHORESIS SERUM: CPT | Mod: PORLAB

## 2025-07-15 PROCEDURE — 2500000004 HC RX 250 GENERAL PHARMACY W/ HCPCS (ALT 636 FOR OP/ED): Performed by: NURSE PRACTITIONER

## 2025-07-15 RX ORDER — FAMOTIDINE 10 MG/ML
20 INJECTION, SOLUTION INTRAVENOUS ONCE AS NEEDED
OUTPATIENT
Start: 2025-08-12

## 2025-07-15 RX ORDER — DIPHENHYDRAMINE HYDROCHLORIDE 50 MG/ML
50 INJECTION, SOLUTION INTRAMUSCULAR; INTRAVENOUS AS NEEDED
OUTPATIENT
Start: 2025-08-12

## 2025-07-15 RX ORDER — ALBUTEROL SULFATE 0.83 MG/ML
3 SOLUTION RESPIRATORY (INHALATION) AS NEEDED
OUTPATIENT
Start: 2025-08-12

## 2025-07-15 RX ORDER — CYANOCOBALAMIN 1000 UG/ML
1000 INJECTION, SOLUTION INTRAMUSCULAR; SUBCUTANEOUS ONCE
Status: COMPLETED | OUTPATIENT
Start: 2025-07-15 | End: 2025-07-15

## 2025-07-15 RX ORDER — EPINEPHRINE 0.3 MG/.3ML
0.3 INJECTION SUBCUTANEOUS EVERY 5 MIN PRN
OUTPATIENT
Start: 2025-08-12

## 2025-07-15 RX ORDER — CYANOCOBALAMIN 1000 UG/ML
1000 INJECTION, SOLUTION INTRAMUSCULAR; SUBCUTANEOUS ONCE
OUTPATIENT
Start: 2025-08-12

## 2025-07-15 RX ADMIN — CYANOCOBALAMIN 1000 MCG: 1000 INJECTION, SOLUTION INTRAMUSCULAR at 13:35

## 2025-07-15 ASSESSMENT — PAIN SCALES - GENERAL: PAINLEVEL_OUTOF10: 0-NO PAIN

## 2025-07-15 NOTE — PROGRESS NOTES
Patient is here for b12 injection. Tolerated well. Patient advised to schedule appt for gi issues and shortness of breath. Patient is aware of next appt and discharged in stable condition.

## 2025-07-17 LAB
ALBUMIN: 4.3 G/DL (ref 3.4–5)
ALPHA 1 GLOBULIN: 0.3 G/DL (ref 0.2–0.6)
ALPHA 2 GLOBULIN: 0.7 G/DL (ref 0.4–1.1)
BETA GLOBULIN: 0.9 G/DL (ref 0.5–1.2)
GAMMA GLOBULIN: 0.7 G/DL (ref 0.5–1.4)
PATH REVIEW-SERUM PROTEIN ELECTROPHORESIS: NORMAL
PROTEIN ELECTROPHORESIS COMMENT: NORMAL

## 2025-08-12 ENCOUNTER — APPOINTMENT (OUTPATIENT)
Dept: HEMATOLOGY/ONCOLOGY | Facility: CLINIC | Age: 50
End: 2025-08-12
Payer: COMMERCIAL

## 2025-08-23 DIAGNOSIS — E53.8 VITAMIN B12 DEFICIENCY: ICD-10-CM

## 2025-08-23 DIAGNOSIS — D64.9 ANEMIA, UNSPECIFIED TYPE: Primary | ICD-10-CM

## 2025-08-23 DIAGNOSIS — D50.8 OTHER IRON DEFICIENCY ANEMIA: ICD-10-CM

## 2025-08-25 ENCOUNTER — OFFICE VISIT (OUTPATIENT)
Dept: HEMATOLOGY/ONCOLOGY | Facility: CLINIC | Age: 50
End: 2025-08-25
Payer: COMMERCIAL

## 2025-08-25 ENCOUNTER — INFUSION (OUTPATIENT)
Dept: HEMATOLOGY/ONCOLOGY | Facility: CLINIC | Age: 50
End: 2025-08-25
Payer: COMMERCIAL

## 2025-08-25 VITALS
HEART RATE: 58 BPM | BODY MASS INDEX: 30.97 KG/M2 | WEIGHT: 197.31 LBS | TEMPERATURE: 97.7 F | HEIGHT: 67 IN | OXYGEN SATURATION: 99 % | DIASTOLIC BLOOD PRESSURE: 85 MMHG | RESPIRATION RATE: 16 BRPM | SYSTOLIC BLOOD PRESSURE: 145 MMHG

## 2025-08-25 DIAGNOSIS — E53.8 VITAMIN B12 DEFICIENCY: ICD-10-CM

## 2025-08-25 DIAGNOSIS — D50.8 OTHER IRON DEFICIENCY ANEMIA: ICD-10-CM

## 2025-08-25 DIAGNOSIS — E53.8 VITAMIN B12 DEFICIENCY: Primary | ICD-10-CM

## 2025-08-25 DIAGNOSIS — D64.9 ANEMIA, UNSPECIFIED TYPE: ICD-10-CM

## 2025-08-25 PROBLEM — D50.9 IRON DEFICIENCY ANEMIA: Status: ACTIVE | Noted: 2025-03-01

## 2025-08-25 LAB
ALBUMIN SERPL BCP-MCNC: 4.2 G/DL (ref 3.4–5)
ALP SERPL-CCNC: 71 U/L (ref 33–120)
ALT SERPL W P-5'-P-CCNC: 8 U/L (ref 10–52)
ANION GAP SERPL CALC-SCNC: 12 MMOL/L (ref 10–20)
AST SERPL W P-5'-P-CCNC: 14 U/L (ref 9–39)
BASOPHILS # BLD AUTO: 0.07 X10*3/UL (ref 0–0.1)
BASOPHILS NFR BLD AUTO: 0.8 %
BILIRUB SERPL-MCNC: 0.4 MG/DL (ref 0–1.2)
BUN SERPL-MCNC: 10 MG/DL (ref 6–23)
CALCIUM SERPL-MCNC: 9 MG/DL (ref 8.6–10.3)
CHLORIDE SERPL-SCNC: 101 MMOL/L (ref 98–107)
CO2 SERPL-SCNC: 28 MMOL/L (ref 21–32)
CREAT SERPL-MCNC: 1.03 MG/DL (ref 0.5–1.3)
EGFRCR SERPLBLD CKD-EPI 2021: 88 ML/MIN/1.73M*2
EOSINOPHIL # BLD AUTO: 0.19 X10*3/UL (ref 0–0.7)
EOSINOPHIL NFR BLD AUTO: 2.3 %
ERYTHROCYTE [DISTWIDTH] IN BLOOD BY AUTOMATED COUNT: 19.9 % (ref 11.5–14.5)
FOLATE SERPL-MCNC: 24 NG/ML
GLUCOSE SERPL-MCNC: 97 MG/DL (ref 74–99)
HCT VFR BLD AUTO: 34.1 % (ref 41–52)
HGB BLD-MCNC: 10.9 G/DL (ref 13.5–17.5)
IMM GRANULOCYTES # BLD AUTO: 0.02 X10*3/UL (ref 0–0.7)
IMM GRANULOCYTES NFR BLD AUTO: 0.2 % (ref 0–0.9)
IRON SATN MFR SERPL: 6 % (ref 25–45)
IRON SERPL-MCNC: 26 UG/DL (ref 35–150)
LYMPHOCYTES # BLD AUTO: 1.86 X10*3/UL (ref 1.2–4.8)
LYMPHOCYTES NFR BLD AUTO: 22.1 %
MCH RBC QN AUTO: 22.5 PG (ref 26–34)
MCHC RBC AUTO-ENTMCNC: 32 G/DL (ref 32–36)
MCV RBC AUTO: 71 FL (ref 80–100)
MONOCYTES # BLD AUTO: 0.61 X10*3/UL (ref 0.1–1)
MONOCYTES NFR BLD AUTO: 7.2 %
NEUTROPHILS # BLD AUTO: 5.67 X10*3/UL (ref 1.2–7.7)
NEUTROPHILS NFR BLD AUTO: 67.4 %
NRBC BLD-RTO: ABNORMAL /100{WBCS}
PLATELET # BLD AUTO: 376 X10*3/UL (ref 150–450)
POTASSIUM SERPL-SCNC: 3.9 MMOL/L (ref 3.5–5.3)
PROT SERPL-MCNC: 6.8 G/DL (ref 6.4–8.2)
RBC # BLD AUTO: 4.84 X10*6/UL (ref 4.5–5.9)
SODIUM SERPL-SCNC: 137 MMOL/L (ref 136–145)
TIBC SERPL-MCNC: 468 UG/DL (ref 240–445)
UIBC SERPL-MCNC: 442 UG/DL (ref 110–370)
VIT B12 SERPL-MCNC: 371 PG/ML (ref 211–911)
WBC # BLD AUTO: 8.4 X10*3/UL (ref 4.4–11.3)

## 2025-08-25 PROCEDURE — 3008F BODY MASS INDEX DOCD: CPT | Performed by: NURSE PRACTITIONER

## 2025-08-25 PROCEDURE — 2500000004 HC RX 250 GENERAL PHARMACY W/ HCPCS (ALT 636 FOR OP/ED): Performed by: NURSE PRACTITIONER

## 2025-08-25 PROCEDURE — 99214 OFFICE O/P EST MOD 30 MIN: CPT | Mod: 25 | Performed by: NURSE PRACTITIONER

## 2025-08-25 PROCEDURE — 80053 COMPREHEN METABOLIC PANEL: CPT | Performed by: NURSE PRACTITIONER

## 2025-08-25 PROCEDURE — 3077F SYST BP >= 140 MM HG: CPT | Performed by: NURSE PRACTITIONER

## 2025-08-25 PROCEDURE — 36415 COLL VENOUS BLD VENIPUNCTURE: CPT | Performed by: NURSE PRACTITIONER

## 2025-08-25 PROCEDURE — 1036F TOBACCO NON-USER: CPT | Performed by: NURSE PRACTITIONER

## 2025-08-25 PROCEDURE — 82607 VITAMIN B-12: CPT | Mod: PORLAB | Performed by: NURSE PRACTITIONER

## 2025-08-25 PROCEDURE — 3079F DIAST BP 80-89 MM HG: CPT | Performed by: NURSE PRACTITIONER

## 2025-08-25 PROCEDURE — 82746 ASSAY OF FOLIC ACID SERUM: CPT | Mod: PORLAB | Performed by: NURSE PRACTITIONER

## 2025-08-25 PROCEDURE — 96372 THER/PROPH/DIAG INJ SC/IM: CPT

## 2025-08-25 PROCEDURE — 83540 ASSAY OF IRON: CPT | Performed by: NURSE PRACTITIONER

## 2025-08-25 PROCEDURE — 99214 OFFICE O/P EST MOD 30 MIN: CPT | Performed by: NURSE PRACTITIONER

## 2025-08-25 PROCEDURE — 85025 COMPLETE CBC W/AUTO DIFF WBC: CPT | Performed by: NURSE PRACTITIONER

## 2025-08-25 RX ORDER — CYANOCOBALAMIN 1000 UG/ML
1000 INJECTION, SOLUTION INTRAMUSCULAR; SUBCUTANEOUS ONCE
Status: CANCELLED | OUTPATIENT
Start: 2025-08-25

## 2025-08-25 RX ORDER — ALBUTEROL SULFATE 0.83 MG/ML
3 SOLUTION RESPIRATORY (INHALATION) AS NEEDED
OUTPATIENT
Start: 2025-09-22

## 2025-08-25 RX ORDER — FAMOTIDINE 10 MG/ML
20 INJECTION, SOLUTION INTRAVENOUS ONCE AS NEEDED
Status: CANCELLED | OUTPATIENT
Start: 2025-08-25

## 2025-08-25 RX ORDER — EPINEPHRINE 0.3 MG/.3ML
0.3 INJECTION SUBCUTANEOUS EVERY 5 MIN PRN
Status: CANCELLED | OUTPATIENT
Start: 2025-08-25

## 2025-08-25 RX ORDER — CYANOCOBALAMIN 1000 UG/ML
1000 INJECTION, SOLUTION INTRAMUSCULAR; SUBCUTANEOUS ONCE
Status: COMPLETED | OUTPATIENT
Start: 2025-08-25 | End: 2025-08-25

## 2025-08-25 RX ORDER — CYANOCOBALAMIN 1000 UG/ML
1000 INJECTION, SOLUTION INTRAMUSCULAR; SUBCUTANEOUS ONCE
OUTPATIENT
Start: 2025-09-22

## 2025-08-25 RX ORDER — EPINEPHRINE 0.3 MG/.3ML
0.3 INJECTION SUBCUTANEOUS EVERY 5 MIN PRN
OUTPATIENT
Start: 2025-09-22

## 2025-08-25 RX ORDER — FAMOTIDINE 10 MG/ML
20 INJECTION, SOLUTION INTRAVENOUS ONCE AS NEEDED
OUTPATIENT
Start: 2025-09-22

## 2025-08-25 RX ORDER — ALBUTEROL SULFATE 0.83 MG/ML
3 SOLUTION RESPIRATORY (INHALATION) AS NEEDED
Status: CANCELLED | OUTPATIENT
Start: 2025-08-25

## 2025-08-25 RX ORDER — DIPHENHYDRAMINE HYDROCHLORIDE 50 MG/ML
50 INJECTION, SOLUTION INTRAMUSCULAR; INTRAVENOUS AS NEEDED
Status: CANCELLED | OUTPATIENT
Start: 2025-08-25

## 2025-08-25 RX ORDER — DIPHENHYDRAMINE HYDROCHLORIDE 50 MG/ML
50 INJECTION, SOLUTION INTRAMUSCULAR; INTRAVENOUS AS NEEDED
OUTPATIENT
Start: 2025-09-22

## 2025-08-25 RX ADMIN — CYANOCOBALAMIN 1000 MCG: 1000 INJECTION, SOLUTION INTRAMUSCULAR at 14:43

## 2025-08-25 ASSESSMENT — PAIN SCALES - GENERAL: PAINLEVEL_OUTOF10: 0-NO PAIN

## 2025-09-22 ENCOUNTER — APPOINTMENT (OUTPATIENT)
Dept: HEMATOLOGY/ONCOLOGY | Facility: CLINIC | Age: 50
End: 2025-09-22
Payer: COMMERCIAL

## 2025-09-29 ENCOUNTER — APPOINTMENT (OUTPATIENT)
Dept: HEMATOLOGY/ONCOLOGY | Facility: CLINIC | Age: 50
End: 2025-09-29
Payer: COMMERCIAL

## 2025-10-07 ENCOUNTER — APPOINTMENT (OUTPATIENT)
Dept: HEMATOLOGY/ONCOLOGY | Facility: CLINIC | Age: 50
End: 2025-10-07
Payer: COMMERCIAL

## 2025-12-18 ENCOUNTER — APPOINTMENT (OUTPATIENT)
Dept: PRIMARY CARE | Facility: CLINIC | Age: 50
End: 2025-12-18
Payer: COMMERCIAL